# Patient Record
Sex: MALE | Race: WHITE | NOT HISPANIC OR LATINO | Employment: FULL TIME | URBAN - METROPOLITAN AREA
[De-identification: names, ages, dates, MRNs, and addresses within clinical notes are randomized per-mention and may not be internally consistent; named-entity substitution may affect disease eponyms.]

---
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
## 2021-01-18 ENCOUNTER — OFFICE VISIT (OUTPATIENT)
Dept: FAMILY MEDICINE CLINIC | Facility: CLINIC | Age: 21
End: 2021-01-18
Payer: COMMERCIAL

## 2021-01-18 VITALS
HEART RATE: 85 BPM | HEIGHT: 68 IN | OXYGEN SATURATION: 99 % | DIASTOLIC BLOOD PRESSURE: 76 MMHG | WEIGHT: 184.8 LBS | RESPIRATION RATE: 18 BRPM | BODY MASS INDEX: 28.01 KG/M2 | SYSTOLIC BLOOD PRESSURE: 126 MMHG | TEMPERATURE: 97.8 F

## 2021-01-18 DIAGNOSIS — E66.3 OVERWEIGHT WITH BODY MASS INDEX (BMI) OF 28 TO 28.9 IN ADULT: ICD-10-CM

## 2021-01-18 DIAGNOSIS — Z00.00 ANNUAL PHYSICAL EXAM: Primary | ICD-10-CM

## 2021-01-18 PROCEDURE — 3008F BODY MASS INDEX DOCD: CPT | Performed by: NURSE PRACTITIONER

## 2021-01-18 PROCEDURE — 99395 PREV VISIT EST AGE 18-39: CPT | Performed by: NURSE PRACTITIONER

## 2021-01-18 PROCEDURE — 3725F SCREEN DEPRESSION PERFORMED: CPT | Performed by: NURSE PRACTITIONER

## 2021-01-18 PROCEDURE — 4004F PT TOBACCO SCREEN RCVD TLK: CPT | Performed by: NURSE PRACTITIONER

## 2021-01-18 NOTE — PROGRESS NOTES
HPI:  Mel Sumner is a 24 y o  male here for his yearly health maintenance exam    Patient Active Problem List   Diagnosis    ADHD    Overweight (BMI 25 0-29  9)    Overweight with body mass index (BMI) of 28 to 28 9 in adult     Past Medical History:   Diagnosis Date    ADHD (attention deficit hyperactivity disorder)        1  Advanced Directive: na     2  Durable Power of  for Healthcare: na     3  Social History:               Marital History: single              Work Status: construction- full time              Drug and alcohol History: na              Alcohol Use: na     4  General Health: "fair"              Regular Dental Visits:yes              Vision problems:no              Hearing Loss:no              Immunizations up to date: na                 Lifestyle:                           Healthy Diet:limited junk food                          Tobacco Dorthey Mash- "quitting"                          Regular exercise:gym- weight lift                            PHQ-9 Depression Screening    PHQ-9:   Frequency of the following problems over the past two weeks:      Little interest or pleasure in doing things: 0 - not at all  Feeling down, depressed, or hopeless: 0 - not at all  PHQ-2 Score: 0           No current outpatient medications on file  No current facility-administered medications for this visit        No Known Allergies  Immunization History   Administered Date(s) Administered    DTaP 2000, 2001, 2001, 05/06/2003, 02/11/2005    HPV9 09/11/2018, 06/28/2019, 10/28/2019    Hep B, Adolescent or Pediatric 2001, 2001, 2001    Hepatitis A 04/10/2012, 08/12/2013    HiB 2001, 2001, 2001, 05/15/2002    IPV 2001, 2001, 05/06/2003, 02/11/2005    MMR 05/15/2002, 02/11/2005    Meningococcal MCV4P 04/10/2012, 09/11/2018    Pneumococcal Conjugate PCV 7 2001, 2001, 02/18/2002, 05/15/2002    Tdap 04/10/2012, 08/09/2013    Varicella 02/18/2002, 02/11/2005, 08/09/2013       Patient Care Team:  Josh Daniels as PCP - General (Family Medicine)  Viviana Amor DO as PCP - [address] [place] [address] (RTE)    Review of Systems   Constitutional: Negative for activity change, diaphoresis, fatigue and fever  HENT: Negative for congestion, facial swelling, hearing loss, rhinorrhea, sinus pressure, sinus pain, sneezing, sore throat and voice change  Eyes: Negative for discharge and visual disturbance  Respiratory: Negative for cough, choking, chest tightness, shortness of breath, wheezing and stridor  Cardiovascular: Negative for chest pain, palpitations and leg swelling  Gastrointestinal: Negative for abdominal distention, abdominal pain, constipation, diarrhea, nausea and vomiting  Endocrine: Negative for polydipsia, polyphagia and polyuria  Genitourinary: Negative for difficulty urinating, dysuria, frequency and urgency  Musculoskeletal: Negative for arthralgias, back pain, gait problem, joint swelling, myalgias, neck pain and neck stiffness  Skin: Negative for color change, rash and wound  Neurological: Negative for dizziness, syncope, speech difficulty, weakness, light-headedness and headaches  Hematological: Negative for adenopathy  Does not bruise/bleed easily  Psychiatric/Behavioral: Negative for agitation, behavioral problems, confusion, hallucinations, sleep disturbance and suicidal ideas  The patient is not nervous/anxious  Physical Exam :  Physical Exam  Vitals signs and nursing note reviewed  Constitutional:       General: He is not in acute distress  Appearance: He is well-developed  He is not diaphoretic  HENT:      Head: Normocephalic and atraumatic        Right Ear: Tympanic membrane, ear canal and external ear normal       Left Ear: Tympanic membrane, ear canal and external ear normal       Nose: Nose normal       Right Sinus: No maxillary sinus tenderness or frontal sinus tenderness  Left Sinus: No maxillary sinus tenderness or frontal sinus tenderness  Mouth/Throat:      Pharynx: Uvula midline  No oropharyngeal exudate  Eyes:      General:         Right eye: No discharge  Left eye: No discharge  Conjunctiva/sclera: Conjunctivae normal       Pupils: Pupils are equal, round, and reactive to light  Neck:      Musculoskeletal: Normal range of motion and neck supple  Thyroid: No thyromegaly  Trachea: No tracheal deviation  Cardiovascular:      Rate and Rhythm: Normal rate and regular rhythm  Heart sounds: Normal heart sounds  No murmur  No friction rub  No gallop  Pulmonary:      Effort: Pulmonary effort is normal  No respiratory distress  Breath sounds: Normal breath sounds  No wheezing or rales  Abdominal:      General: Bowel sounds are normal  There is no distension  Palpations: Abdomen is soft  There is no mass  Tenderness: There is no abdominal tenderness  There is no guarding or rebound  Musculoskeletal: Normal range of motion  General: No tenderness or deformity  Lymphadenopathy:      Cervical: No cervical adenopathy  Skin:     General: Skin is warm and dry  Findings: No erythema or rash  Neurological:      Mental Status: He is alert and oriented to person, place, and time  Cranial Nerves: No cranial nerve deficit  Coordination: Coordination normal    Psychiatric:         Mood and Affect: Mood normal          Speech: Speech normal          Behavior: Behavior normal          Thought Content: Thought content normal          Judgment: Judgment normal            Reviewed Updated [facility] Prior Wellness Visits:   Last Health Maintenance visit information was reviewed, patient interviewed , no change since last HM visit no  Last HM visit information was reviewed, patient interviewed and updates made to the record today no    Assessment and Plan:  1  Annual physical exam     2   Overweight with body mass index (BMI) of 28 to 28 9 in adult       BMI Counseling: Body mass index is 28 1 kg/m²  The BMI is above normal  Nutrition recommendations include decreasing portion sizes, encouraging healthy choices of fruits and vegetables, decreasing fast food intake, consuming healthier snacks, limiting drinks that contain sugar, moderation in carbohydrate intake and increasing intake of lean protein  Exercise recommendations include exercising 3-5 times per week  Tobacco Cessation Counseling: Tobacco cessation counseling was provided   The patient is sincerely urged to quit consumption of tobacco  He is ready to quit tobacco      Health Maintenance Due   Topic Date Due    Pneumococcal Vaccine: Pediatrics (0 to 5 Years) and At-Risk Patients (6 to 59 Years) (1 of 1 - PPSV23) 02/05/2007    HIV Screening  02/05/2016    Annual Physical  02/05/2019    Depression Screening PHQ  06/28/2020    BMI: Followup Plan  06/28/2020    Influenza Vaccine (1) 09/01/2020

## 2021-02-16 ENCOUNTER — OFFICE VISIT (OUTPATIENT)
Dept: URGENT CARE | Facility: CLINIC | Age: 21
End: 2021-02-16
Payer: COMMERCIAL

## 2021-02-16 VITALS — TEMPERATURE: 97.4 F | RESPIRATION RATE: 18 BRPM | HEART RATE: 88 BPM | OXYGEN SATURATION: 100 %

## 2021-02-16 DIAGNOSIS — Z11.59 SPECIAL SCREENING EXAMINATION FOR UNSPECIFIED VIRAL DISEASE: Primary | ICD-10-CM

## 2021-02-16 PROCEDURE — U0003 INFECTIOUS AGENT DETECTION BY NUCLEIC ACID (DNA OR RNA); SEVERE ACUTE RESPIRATORY SYNDROME CORONAVIRUS 2 (SARS-COV-2) (CORONAVIRUS DISEASE [COVID-19]), AMPLIFIED PROBE TECHNIQUE, MAKING USE OF HIGH THROUGHPUT TECHNOLOGIES AS DESCRIBED BY CMS-2020-01-R: HCPCS | Performed by: NURSE PRACTITIONER

## 2021-02-16 PROCEDURE — 99213 OFFICE O/P EST LOW 20 MIN: CPT | Performed by: NURSE PRACTITIONER

## 2021-02-16 PROCEDURE — G0382 LEV 3 HOSP TYPE B ED VISIT: HCPCS | Performed by: NURSE PRACTITIONER

## 2021-02-16 PROCEDURE — U0005 INFEC AGEN DETEC AMPLI PROBE: HCPCS | Performed by: NURSE PRACTITIONER

## 2021-02-16 PROCEDURE — 99283 EMERGENCY DEPT VISIT LOW MDM: CPT | Performed by: NURSE PRACTITIONER

## 2021-02-16 NOTE — PROGRESS NOTES
[address]        NAME: Bridger Willis is a 22 y o  male  : 2000    MRN: [id number]  DATE: 2021  TIME: 9:52 AM    Assessment and Plan   Special screening examination for unspecified viral disease [Z11 59]  1  Special screening examination for unspecified viral disease  Novel Coronavirus (Covid-19),PCR [facility]         Patient Instructions     Patient Instructions   Patient instructed to self quarantine  Advised to avoid nsaids  If you develop prolonged high fever, worsening or productive cough, shortness of breath, difficulty breathing, chest pain, or any new or concerning symptoms please proceed ER  Instructed patient to call ER prior to arrival make them aware she is being test for covid  Patient verbalizes understanding  Start vitamin c 1g twice daily,vitamin d3 2000IU daily, and multivitamin  monitor pulse ox  Call PCP in 24 hours for f/u  [address]    Your healthcare provider and/or public health staff have evaluated you and have determined that you do not need to remain in the hospital at this time  At this time you can be isolated at home where you will be monitored by staff from your local or state health department  You should carefully follow the prevention and isolation steps below until a healthcare provider or local or state health department says that you can return to your normal activities  Stay home except to get medical care    People who are mildly ill with COVID-19 are able to isolate at home during their illness  You should restrict activities outside your home, except for getting medical care  Do not go to work, school, or public areas  Avoid using public transportation, ride-sharing, or taxis  Separate yourself from other people and animals in your home    People: As much as possible, you should stay in a specific room and away from other people in your home   Also, you should use a separate bathroom, if available  Animals: You should restrict contact with pets and other animals while you are sick with COVID-19, just like you would around other people  Although there have not been reports of pets or other animals becoming sick with COVID-19, it is still recommended that people sick with COVID-19 limit contact with animals until more information is known about the virus  When possible, have another member of your household care for your animals while you are sick  If you are sick with COVID-19, avoid contact with your pet, including petting, snuggling, being kissed or licked, and sharing food  If you must care for your pet or be around animals while you are sick, wash your hands before and after you interact with pets and wear a facemask  See COVID-19 and Animals for more information  Call ahead before visiting your doctor    If you have a medical appointment, call the healthcare provider and tell them that you have or may have COVID-19  This will help the healthcare providers office take steps to keep other people from getting infected or exposed  Wear a facemask    You should wear a facemask when you are around other people (e g , sharing a room or vehicle) or pets and before you enter a healthcare providers office  If you are not able to wear a facemask (for example, because it causes trouble breathing), then people who live with you should not stay in the same room with you, or they should wear a facemask if they enter your room  Cover your coughs and sneezes    Cover your mouth and nose with a tissue when you cough or sneeze  Throw used tissues in a lined trash can  Immediately wash your hands with soap and water for at least 20 seconds or, if soap and water are not available, clean your hands with an alcohol-based hand  that contains at least 60% alcohol      Clean your hands often    Wash your hands often with soap and water for at least 20 seconds, especially after blowing your nose, coughing, or sneezing; going to the bathroom; and before eating or preparing food  If soap and water are not readily available, use an alcohol-based hand  with at least 60% alcohol, covering all surfaces of your hands and rubbing them together until they feel dry  Soap and water are the best option if hands are visibly dirty  Avoid touching your eyes, nose, and mouth with unwashed hands  Avoid sharing personal household items    You should not share dishes, drinking glasses, cups, eating utensils, towels, or bedding with other people or pets in your home  After using these items, they should be washed thoroughly with soap and water  Clean all high-touch surfaces everyday    High touch surfaces include counters, tabletops, doorknobs, bathroom fixtures, toilets, phones, keyboards, tablets, and bedside tables  Also, clean any surfaces that may have blood, stool, or body fluids on them  Use a household cleaning spray or wipe, according to the label instructions  Labels contain instructions for safe and effective use of the cleaning product including precautions you should take when applying the product, such as wearing gloves and making sure you have good ventilation during use of the product  Monitor your symptoms    Seek prompt medical attention if your illness is worsening (e g , difficulty breathing)  Before seeking care, call your healthcare provider and tell them that you have, or are being evaluated for, COVID-19  Put on a facemask before you enter the facility  These steps will help the healthcare providers office to keep other people in the office or waiting room from getting infected or exposed  Ask your healthcare provider to call the local or state health department  Persons who are placed under active monitoring or facilitated self-monitoring should follow instructions provided by their local health department or occupational health professionals, as appropriate    If you have a medical emergency and need to call 911, notify the dispatch personnel that you have, or are being evaluated for COVID-19  If possible, put on a facemask before emergency medical services arrive  Discontinuing home isolation    Patients with confirmed COVID-19 should remain under home isolation precautions until the following conditions are met:   - They have had no fever for at least 24 hours (that is one full day of no fever without the use medicine that reduces fevers)  AND  - other symptoms have improved (for example, when their cough or shortness of breath have improved)  AND  - If had mild or moderate illness, at least 10 days have passed since their symptoms first appeared or if severe illness (needed oxygen) or immunosuppressed, at least 20 days have passed since symptoms first appeared  Patients with confirmed COVID-19 should also notify close contacts (including their workplace) and ask that they self-quarantine  Currently, close contact is defined as being within 6 feet for 15 minutes or more from the period 24 hours starting 48 hours before symptom onset to the time at which the patient went into isolation  Close contacts of patients diagnosed with COVID-19 should be instructed by the patient to self-quarantine for 14 days from the last time of their last contact with the patient  Source: [employer]           Follow up with PCP in 3-5 days  Proceed to  ER if symptoms worsen  Chief Complaint     Chief Complaint   Patient presents with    COVID-19     Patient c/o cough, bodyaches, and headache x 3 days  Patient exposed to Covid-19 x 5 day ago  History of Present Illness         Patient is a 81-year-old male who presents with  A 3 day history of congestion headache, body aches  States that he was exposed to a co-worker who tested positive for COVID-19  States he was exposed 5 days ago    States they worked outside together unmasked for an entire workday  Patient denies any loss of taste or smell  Denies any fever or chills  Denies shortness of breath, , chest pain,or hemoptysis  Has been taking over-the-counter Tylenol with mild relief  Review of Systems   Review of Systems   Constitutional: Negative for chills, diaphoresis, fatigue and fever  HENT: Positive for congestion and rhinorrhea  Negative for ear discharge, ear pain, facial swelling, mouth sores, postnasal drip, sinus pressure, sinus pain, sore throat and trouble swallowing  Eyes: Negative for photophobia and visual disturbance  Respiratory: Positive for cough  Negative for chest tightness, shortness of breath, wheezing and stridor  Cardiovascular: Negative for chest pain and palpitations  Gastrointestinal: Negative for abdominal pain, diarrhea, nausea and vomiting  Genitourinary: Negative  Musculoskeletal: Positive for myalgias  Negative for arthralgias, back pain, joint swelling, neck pain and neck stiffness  Skin: Negative for rash  Neurological: Positive for headaches  Negative for dizziness, syncope, facial asymmetry, weakness, light-headedness and numbness  Current Medications     No current outpatient medications on file  Current Allergies     Allergies as of 02/16/2021    (No Known Allergies)            The following portions of the patient's history were reviewed and updated as appropriate: allergies, current medications, past family history, past medical history, past social history, past surgical history and problem list      Past Medical History:   Diagnosis Date    ADHD (attention deficit hyperactivity disorder)        History reviewed  No pertinent surgical history  Family History   Problem Relation Age of Onset    No Known Problems Mother     No Known Problems Father          Medications have been verified          Objective   Pulse 88   Temp (!) 97 4 °F (36 3 °C) (Temporal)   Resp 18   SpO2 100%   No LMP for male patient  Physical Exam     Physical Exam  Constitutional:       General: He is not in acute distress  Appearance: He is well-developed  HENT:      Head: Normocephalic and atraumatic  Right Ear: Hearing, tympanic membrane, ear canal and external ear normal       Left Ear: Hearing, tympanic membrane, ear canal and external ear normal       Nose: Rhinorrhea present  No mucosal edema  Right Sinus: No maxillary sinus tenderness or frontal sinus tenderness  Left Sinus: No maxillary sinus tenderness or frontal sinus tenderness  Mouth/Throat:      Mouth: Mucous membranes are moist       Pharynx: Oropharynx is clear  Uvula midline  No oropharyngeal exudate or posterior oropharyngeal erythema  Tonsils: No tonsillar exudate or tonsillar abscesses  1+ on the right  1+ on the left  Cardiovascular:      Rate and Rhythm: Normal rate and regular rhythm  Heart sounds: Normal heart sounds, S1 normal and S2 normal    Pulmonary:      Effort: Pulmonary effort is normal       Breath sounds: Normal breath sounds and air entry  Lymphadenopathy:      Cervical: No cervical adenopathy  Skin:     General: Skin is warm and dry  Capillary Refill: Capillary refill takes less than 2 seconds  Neurological:      Mental Status: He is alert and oriented to person, place, and time

## 2021-02-16 NOTE — PATIENT INSTRUCTIONS
Patient instructed to self quarantine  Advised to avoid nsaids  If you develop prolonged high fever, worsening or productive cough, shortness of breath, difficulty breathing, chest pain, or any new or concerning symptoms please proceed ER  Instructed patient to call ER prior to arrival make them aware she is being test for covid  Patient verbalizes understanding  Start vitamin c 1g twice daily,vitamin d3 2000IU daily, and multivitamin  monitor pulse ox  Call PCP in 24 hours for f/u  [address]    Your healthcare provider and/or public health staff have evaluated you and have determined that you do not need to remain in the hospital at this time  At this time you can be isolated at home where you will be monitored by staff from your local or state health department  You should carefully follow the prevention and isolation steps below until a healthcare provider or local or state health department says that you can return to your normal activities  Stay home except to get medical care    People who are mildly ill with COVID-19 are able to isolate at home during their illness  You should restrict activities outside your home, except for getting medical care  Do not go to work, school, or public areas  Avoid using public transportation, ride-sharing, or taxis  Separate yourself from other people and animals in your home    People: As much as possible, you should stay in a specific room and away from other people in your home  Also, you should use a separate bathroom, if available  Animals: You should restrict contact with pets and other animals while you are sick with COVID-19, just like you would around other people  Although there have not been reports of pets or other animals becoming sick with COVID-19, it is still recommended that people sick with COVID-19 limit contact with animals until more information is known about the virus   When possible, have another member of your household care for your animals while you are sick  If you are sick with COVID-19, avoid contact with your pet, including petting, snuggling, being kissed or licked, and sharing food  If you must care for your pet or be around animals while you are sick, wash your hands before and after you interact with pets and wear a facemask  See COVID-19 and Animals for more information  Call ahead before visiting your doctor    If you have a medical appointment, call the healthcare provider and tell them that you have or may have COVID-19  This will help the healthcare providers office take steps to keep other people from getting infected or exposed  Wear a facemask    You should wear a facemask when you are around other people (e g , sharing a room or vehicle) or pets and before you enter a healthcare providers office  If you are not able to wear a facemask (for example, because it causes trouble breathing), then people who live with you should not stay in the same room with you, or they should wear a facemask if they enter your room  Cover your coughs and sneezes    Cover your mouth and nose with a tissue when you cough or sneeze  Throw used tissues in a lined trash can  Immediately wash your hands with soap and water for at least 20 seconds or, if soap and water are not available, clean your hands with an alcohol-based hand  that contains at least 60% alcohol  Clean your hands often    Wash your hands often with soap and water for at least 20 seconds, especially after blowing your nose, coughing, or sneezing; going to the bathroom; and before eating or preparing food  If soap and water are not readily available, use an alcohol-based hand  with at least 60% alcohol, covering all surfaces of your hands and rubbing them together until they feel dry  Soap and water are the best option if hands are visibly dirty  Avoid touching your eyes, nose, and mouth with unwashed hands      Avoid sharing personal household items    You should not share dishes, drinking glasses, cups, eating utensils, towels, or bedding with other people or pets in your home  After using these items, they should be washed thoroughly with soap and water  Clean all high-touch surfaces everyday    High touch surfaces include counters, tabletops, doorknobs, bathroom fixtures, toilets, phones, keyboards, tablets, and bedside tables  Also, clean any surfaces that may have blood, stool, or body fluids on them  Use a household cleaning spray or wipe, according to the label instructions  Labels contain instructions for safe and effective use of the cleaning product including precautions you should take when applying the product, such as wearing gloves and making sure you have good ventilation during use of the product  Monitor your symptoms    Seek prompt medical attention if your illness is worsening (e g , difficulty breathing)  Before seeking care, call your healthcare provider and tell them that you have, or are being evaluated for, COVID-19  Put on a facemask before you enter the facility  These steps will help the healthcare providers office to keep other people in the office or waiting room from getting infected or exposed  Ask your healthcare provider to call the local or [facility] health department  Persons who are placed under active monitoring or facilitated self-monitoring should follow instructions provided by their local health department or occupational health professionals, as appropriate  If you have a medical emergency and need to call 911, notify the dispatch personnel that you have, or are being evaluated for COVID-19  If possible, put on a facemask before emergency medical services arrive      Discontinuing home isolation    Patients with confirmed COVID-19 should remain under home isolation precautions until the following conditions are met:   - They have had no fever for at least 24 hours (that is one full day of no fever without the use medicine that reduces fevers)  AND  - other symptoms have improved (for example, when their cough or shortness of breath have improved)  AND  - If had mild or moderate illness, at least 10 days have passed since their symptoms first appeared or if severe illness (needed oxygen) or immunosuppressed, at least 20 days have passed since symptoms first appeared  Patients with confirmed COVID-19 should also notify close contacts (including their workplace) and ask that they self-quarantine  Currently, close contact is defined as being within 6 feet for 15 minutes or more from the period 24 hours starting 48 hours before symptom onset to the time at which the patient went into isolation  Close contacts of patients diagnosed with COVID-19 should be instructed by the patient to self-quarantine for 14 days from the last time of their last contact with the patient       Source: [employer]

## 2021-02-17 ENCOUNTER — DOCUMENTATION (OUTPATIENT)
Dept: URGENT CARE | Facility: CLINIC | Age: 21
End: 2021-02-17

## 2021-02-17 LAB — SARS-COV-2 RNA RESP QL NAA+PROBE: NEGATIVE

## 2021-04-21 ENCOUNTER — TELEPHONE (OUTPATIENT)
Dept: FAMILY MEDICINE CLINIC | Facility: CLINIC | Age: 21
End: 2021-04-21

## 2021-04-21 NOTE — TELEPHONE ENCOUNTER
He was getting SSI  They owe him back pay  He needs a letter from his dr that he does not need a representative payee

## 2021-04-22 NOTE — TELEPHONE ENCOUNTER
I have no seen him for the reason he is on SSI- what is his diagnosis?  You can schedule him with an available provider to discuss

## 2021-05-04 ENCOUNTER — TELEPHONE (OUTPATIENT)
Dept: FAMILY MEDICINE CLINIC | Facility: CLINIC | Age: 21
End: 2021-05-04

## 2021-05-06 ENCOUNTER — OFFICE VISIT (OUTPATIENT)
Dept: FAMILY MEDICINE CLINIC | Facility: CLINIC | Age: 21
End: 2021-05-06
Payer: COMMERCIAL

## 2021-05-06 VITALS
HEART RATE: 81 BPM | BODY MASS INDEX: 28.49 KG/M2 | HEIGHT: 68 IN | TEMPERATURE: 98.4 F | SYSTOLIC BLOOD PRESSURE: 128 MMHG | OXYGEN SATURATION: 97 % | RESPIRATION RATE: 20 BRPM | WEIGHT: 188 LBS | DIASTOLIC BLOOD PRESSURE: 76 MMHG

## 2021-05-06 DIAGNOSIS — Z86.59 HISTORY OF ADHD: Primary | ICD-10-CM

## 2021-05-06 PROCEDURE — 1036F TOBACCO NON-USER: CPT | Performed by: NURSE PRACTITIONER

## 2021-05-06 PROCEDURE — 3008F BODY MASS INDEX DOCD: CPT | Performed by: NURSE PRACTITIONER

## 2021-05-06 PROCEDURE — 3725F SCREEN DEPRESSION PERFORMED: CPT | Performed by: NURSE PRACTITIONER

## 2021-05-06 PROCEDURE — 99212 OFFICE O/P EST SF 10 MIN: CPT | Performed by: NURSE PRACTITIONER

## 2021-05-06 NOTE — PROGRESS NOTES
[address] [place] [address] Primary Care        NAME: Eve Jaramillo is a 22 y o  male  : 2000    MRN: [id number]  DATE: May 6, 2021  TIME: 10:00 AM    Assessment and Plan   History of ADHD [Z86 59]  1  History of ADHD       1  History of ADHD  Letter for patient stating that he can manage his own money  Faxed to number given by patient  Patient Instructions     There are no Patient Instructions on file for this visit  Chief Complaint     Chief Complaint   Patient presents with    ADHD     pt requesting letter for SSI due to his ADHD         History of Present Illness       Patient here for an acute visit with a request for a letter for SSI due to his diagnosis of ADHD in the past and now is able to get back pay that they owe him  Needs a letter stating that he can manage his own money  Has been grocery shopping  Gives his mom money for his cell phone bill months and gives his mother money for her bills if she needs  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, doing construction  Living with mom until he gets more money to get his own place     Reports that he was on medication about 5 years ago        Review of Systems   Review of Systems   Constitutional: Negative  Respiratory: Negative  Negative for shortness of breath and wheezing  Cardiovascular: Negative  Negative for chest pain and palpitations  Musculoskeletal: Negative for myalgias  Neurological: Negative  Psychiatric/Behavioral: Negative  All other systems reviewed and are negative  PHQ-9 Depression Screening    PHQ-9:   Frequency of the following problems over the past two weeks:      Little interest or pleasure in doing things: 0 - not at all  Feeling down, depressed, or hopeless: 0 - not at all  PHQ-2 Score: 0        Current Medications     No current outpatient medications on file      Current Allergies     Allergies as of 2021    (No Known Allergies)            The following portions of the patient's history were reviewed and updated as appropriate: allergies, current medications, past family history, past medical history, past social history, past surgical history and problem list      Past Medical History:   Diagnosis Date    ADHD (attention deficit hyperactivity disorder)        No past surgical history on file  Family History   Problem Relation Age of Onset    No Known Problems Mother     No Known Problems Father          Medications have been verified  Objective   /76   Pulse 81   Temp 98 4 °F (36 9 °C)   Resp 20   Ht 5' 8" (1 727 m)   Wt 85 3 kg (188 lb)   SpO2 97%   BMI 28 59 kg/m²        Physical Exam     Physical Exam  Vitals signs and nursing note reviewed  Constitutional:       General: He is not in acute distress  Appearance: He is well-developed  He is not ill-appearing or diaphoretic  Neck:      Trachea: No tracheal deviation  Pulmonary:      Effort: Pulmonary effort is normal  No tachypnea, accessory muscle usage or respiratory distress  Neurological:      Mental Status: He is alert and oriented to person, place, and time  Psychiatric:         Speech: Speech normal          Behavior: Behavior normal  Behavior is cooperative  Thought Content:  Thought content normal

## 2021-06-21 ENCOUNTER — OFFICE VISIT (OUTPATIENT)
Dept: INTERNAL MEDICINE CLINIC | Facility: CLINIC | Age: 21
End: 2021-06-21

## 2021-06-21 VITALS
HEART RATE: 76 BPM | OXYGEN SATURATION: 98 % | TEMPERATURE: 98.9 F | WEIGHT: 182 LBS | HEIGHT: 71 IN | BODY MASS INDEX: 25.48 KG/M2 | DIASTOLIC BLOOD PRESSURE: 70 MMHG | SYSTOLIC BLOOD PRESSURE: 128 MMHG

## 2021-06-21 DIAGNOSIS — Z02.1 PHYSICAL EXAM, PRE-EMPLOYMENT: Primary | ICD-10-CM

## 2021-06-21 DIAGNOSIS — Z11.1 SCREENING FOR TUBERCULOSIS: ICD-10-CM

## 2021-06-21 PROCEDURE — 99499 UNLISTED E&M SERVICE: CPT | Performed by: INTERNAL MEDICINE

## 2021-06-21 PROCEDURE — 86580 TB INTRADERMAL TEST: CPT

## 2021-06-21 PROCEDURE — 3008F BODY MASS INDEX DOCD: CPT | Performed by: NURSE PRACTITIONER

## 2021-06-21 NOTE — PROGRESS NOTES
Assessment/Plan:    Pre-employment  Physical  exam     Diagnoses and all orders for this visit:    Physical exam, pre-employment    Screening for tuberculosis  -     TB Skin Test          Subjective:      Patient ID: Victorino Moon is a 22 y o  male  Paitent  Is here  For  A  Pre-employment  And  PPD      The following portions of the patient's history were reviewed and updated as appropriate: allergies, current medications, past family history, past medical history, past social history, past surgical history, and problem list     Review of Systems   Constitutional: Negative  HENT: Negative for dental problem, drooling, ear discharge and ear pain  Eyes: Negative for discharge, redness and itching  Respiratory: Negative for apnea, cough and wheezing  Cardiovascular: Negative for chest pain and palpitations  Gastrointestinal: Negative for abdominal pain, blood in stool, diarrhea and vomiting  Endocrine: Negative for polydipsia, polyphagia and polyuria  Genitourinary: Negative for decreased urine volume, dysuria and frequency  Musculoskeletal: Negative for arthralgias, myalgias and neck stiffness  Skin: Negative for pallor and wound  Allergic/Immunologic: Negative for environmental allergies and food allergies  Neurological: Negative for facial asymmetry, light-headedness, numbness and headaches  Hematological: Negative for adenopathy  Does not bruise/bleed easily  Psychiatric/Behavioral: Negative for agitation, behavioral problems and confusion  Objective:      /70 (BP Location: Left arm, Patient Position: Sitting, Cuff Size: Standard)   Pulse 76   Temp 98 9 °F (37 2 °C) (Temporal)   Ht 5' 10 5" (1 791 m)   Wt 82 6 kg (182 lb)   SpO2 98%   BMI 25 75 kg/m²          Physical Exam  Constitutional:       Appearance: Normal appearance  HENT:      Head: Normocephalic        Nose: Nose normal       Mouth/Throat:      Mouth: Mucous membranes are moist    Eyes:      Pupils: Pupils are equal, round, and reactive to light  Cardiovascular:      Rate and Rhythm: Regular rhythm  Heart sounds: Normal heart sounds  Pulmonary:      Breath sounds: Normal breath sounds  Abdominal:      Palpations: Abdomen is soft  Musculoskeletal:         General: No swelling  Cervical back: Neck supple  Skin:     General: Skin is warm  Neurological:      General: No focal deficit present  Mental Status: He is alert and oriented to person, place, and time  Psychiatric:         Mood and Affect: Mood normal        Normal  Physical  Exam   PPD  Will be  Ready in  48 hrs       GILBERTO Del Rio MD

## 2021-06-24 ENCOUNTER — CLINICAL SUPPORT (OUTPATIENT)
Dept: INTERNAL MEDICINE CLINIC | Facility: CLINIC | Age: 21
End: 2021-06-24

## 2021-06-24 DIAGNOSIS — Z11.1 SCREENING FOR TUBERCULOSIS: Primary | ICD-10-CM

## 2021-06-24 LAB
INDURATION: 0 MM
TB SKIN TEST: NEGATIVE

## 2021-07-22 ENCOUNTER — OFFICE VISIT (OUTPATIENT)
Dept: URGENT CARE | Facility: CLINIC | Age: 21
End: 2021-07-22
Payer: COMMERCIAL

## 2021-07-22 VITALS
BODY MASS INDEX: 25.89 KG/M2 | WEIGHT: 183 LBS | TEMPERATURE: 97.8 F | RESPIRATION RATE: 16 BRPM | DIASTOLIC BLOOD PRESSURE: 63 MMHG | HEART RATE: 68 BPM | OXYGEN SATURATION: 100 % | SYSTOLIC BLOOD PRESSURE: 135 MMHG

## 2021-07-22 DIAGNOSIS — H60.02 ABSCESS OF EXTERNAL EAR, LEFT: Primary | ICD-10-CM

## 2021-07-22 PROCEDURE — 99213 OFFICE O/P EST LOW 20 MIN: CPT | Performed by: NURSE PRACTITIONER

## 2021-07-22 RX ORDER — CEPHALEXIN 500 MG/1
500 CAPSULE ORAL EVERY 8 HOURS SCHEDULED
Qty: 21 CAPSULE | Refills: 0 | Status: SHIPPED | OUTPATIENT
Start: 2021-07-22 | End: 2021-07-29

## 2021-07-22 NOTE — PATIENT INSTRUCTIONS
Warm compresses 3 times a day  Start antibiotic  Take probiotic  If no improvement follow-up with PCP  Go to ER with any worsening symptoms

## 2021-07-22 NOTE — PROGRESS NOTES
[facility]        NAME: Holland Tan is a 22 y o  male  : 2000    MRN: [id number]  DATE: 2021  TIME: 6:45 PM    Assessment and Plan   Abscess of external ear, left [H60 02]  1  Abscess of external ear, left  cephalexin (KEFLEX) 500 mg capsule         Patient Instructions     Patient Instructions   Warm compresses 3 times a day  Start antibiotic  Take probiotic  If no improvement follow-up with PCP  Go to ER with any worsening symptoms  Chief Complaint     Chief Complaint   Patient presents with    Illness     lump in left ear x 1 week, painful to touch  Attempted several times to pop it         History of Present Illness   Holland Tan presents to the clinic c/o    This is a 67-year-old male here today with complaints of lump in the antihelix region  He states he has had this for about a week  He states he has attempted to pop this  There is no drainage from site  Pain with palpation of the area  No fevers bodies or chills  Review of Systems   Review of Systems   Constitutional: Negative for activity change, chills, fatigue and fever  HENT: Negative for congestion and rhinorrhea  Skin: Positive for wound  Neurological: Negative  Psychiatric/Behavioral: Negative  Current Medications     No long-term medications on file  Current Allergies     Allergies as of 2021    (No Known Allergies)            The following portions of the patient's history were reviewed and updated as appropriate: allergies, current medications, past family history, past medical history, past social history, past surgical history and problem list     Objective   /63   Pulse 68   Temp 97 8 °F (36 6 °C)   Resp 16   Wt 83 kg (183 lb)   SpO2 100%   BMI 25 89 kg/m²        Physical Exam     Physical Exam  Vitals and nursing note reviewed  Constitutional:       Appearance: Normal appearance     HENT:      Ears:     Cardiovascular:      Rate and Rhythm: Normal rate and regular rhythm  Pulses: Normal pulses  Heart sounds: Normal heart sounds  Neurological:      Mental Status: He is alert and oriented to person, place, and time  Psychiatric:         Mood and Affect: Mood normal          Behavior: Behavior normal          Thought Content:  Thought content normal          Judgment: Judgment normal

## 2021-09-28 ENCOUNTER — OFFICE VISIT (OUTPATIENT)
Dept: URGENT CARE | Facility: CLINIC | Age: 21
End: 2021-09-28
Payer: COMMERCIAL

## 2021-09-28 VITALS
HEIGHT: 70 IN | RESPIRATION RATE: 18 BRPM | OXYGEN SATURATION: 99 % | BODY MASS INDEX: 25.77 KG/M2 | WEIGHT: 180 LBS | SYSTOLIC BLOOD PRESSURE: 132 MMHG | TEMPERATURE: 98.2 F | DIASTOLIC BLOOD PRESSURE: 79 MMHG | HEART RATE: 76 BPM

## 2021-09-28 DIAGNOSIS — L25.5 CONTACT DERMATITIS DUE TO PLANT: Primary | ICD-10-CM

## 2021-09-28 PROCEDURE — 99213 OFFICE O/P EST LOW 20 MIN: CPT | Performed by: NURSE PRACTITIONER

## 2021-09-28 RX ORDER — PREDNISONE 10 MG/1
TABLET ORAL
Qty: 26 TABLET | Refills: 0 | Status: SHIPPED | OUTPATIENT
Start: 2021-09-28 | End: 2022-04-14

## 2021-09-28 NOTE — PATIENT INSTRUCTIONS
Take medication as directed  Can take Benadryl as needed for itching, it can make you drowsy  Oat meal soaks can be helpful  If you develop any increased redness, rash is spreading, facial swelling, shortness of breath, difficulty breathing, fever, any new or concerning symptoms please return or proceed ER  Advised follow-up with PCP in 3-5 days      Poison Ivy   WHAT YOU NEED TO KNOW:   Poison ivy is a plant that can cause an itchy, uncomfortable rash on your skin  Poison ivy grows as a shrub or vine in woods, fields, and areas of thick [place]  It has 3 bright green leaves on each stem that turn red in [place]  DISCHARGE INSTRUCTIONS:   Medicines:   · Antiseptic or drying creams or ointments: These medicines may be used to dry out the rash and decrease the itching  These products may be available without a doctor's order  · Steroids: This medicine helps decrease itching and inflammation  It can be given as a cream to apply to your skin or as a pill  · Antihistamines: This medicine may help decrease itching and help you sleep  It is available without a doctor's order  · Take your medicine as directed  Contact your healthcare provider if you think your medicine is not helping or if you have side effects  Tell him of her if you are allergic to any medicine  Keep a list of the medicines, vitamins, and herbs you take  Include the amounts, and when and why you take them  Bring the list or the pill bottles to follow-up visits  Carry your medicine list with you in case of an emergency  Follow up with your healthcare provider as directed:  Write down your questions so you remember to ask them during your visits  How your poison ivy rash spreads: You cannot spread poison ivy by touching your rash or the liquid from your blisters  Poison ivy is spread only if you scratch your skin while it still has oil on it  You may think your rash is spreading because new rashes appear over a number of days  This happens because areas covered by thin skin break out in a rash first  Your face or forearms may develop a rash before thicker areas, such as the palms of your hands  Self-care:   · Keep your rash clean and dry:  Wash it with soap and water  Gently pat it dry with a clean towel  · Try not to scratch or rub your rash: This can cause your skin to become infected  · Use a compress on your rash:  Dip a clean washcloth in cool water  Wring it out and place it on your rash  Leave the washcloth on your skin for 15 minutes  Do this at least 3 times per day  · Take a cornstarch or oatmeal bath: If your rash is too large to cover with wet washcloths, take 3 or 4 cornstarch baths daily  Mix 1 pound of cornstarch with a little water to make a paste  Add the paste to a tub full of water and mix well  You may also use colloidal oatmeal in the bath water  Use lukewarm water  Avoid hot water because it may cause your itching to increase  Prevent a poison ivy rash in the future:   · Wear skin protection:  Wear long pants, a long-sleeved shirt, and gloves  Use a skin block lotion to protect your skin from poison ivy oil  You can find this at a drugstore without a prescription  · Wash clothing after possible exposure: If you think you have been near a poison ivy plant, wash the clothes you were wearing separately from other clothes  Rinse the washing machine well after you take the clothes out  Scrub boots and shoes with warm, soapy water  Dry clean items and clothing that you cannot wash in water  Poison ivy oil is sticky and can stay on surfaces for a long time  It can cause a new rash even years later  · Bathe your pet:  Use warm water and shampoo on your pet's fur  This will prevent the spread of oil to your skin, car, and home  Wear long sleeves, long pants, and gloves while washing pets or any items that may have oil on them      · Reduce exposure to poison ivy:  Do not touch plants that look like poison ivy  Keep your yard free of poison ivy  While protecting your skin, remove the plant and the roots  Place them in a plastic bag and seal the bag tightly  · Do not burn poison ivy plants: This can spread the oil through the air  If you breathe the oil into your lungs, you could have swelling and serious breathing problems  Oil that clings to the fire eileen can land on your skin and cause a rash  Contact your healthcare provider if:   · You have pus, soft yellow scabs, or tenderness on the rash  · The itching gets worse or keeps you awake at night  · The rash covers more than 1/4 of your skin or spreads to your eyes, mouth, or genital area  · The rash is not better after 2 to 3 weeks  · You have tender, swollen glands on the sides of your neck  · You have swelling in your arms and legs  · You have questions or concerns about your condition or care  Return to the emergency department if:   · You have a fever  · You have redness, swelling, and tenderness around the rash  · You have trouble breathing  © Copyright [employer] 2021 Information is for End User's use only and may not be sold, redistributed or otherwise used for commercial purposes  All illustrations and images included in CareNotes® are the copyrighted property of [employer]  or [facility] Larry Healy   The above information is an  only  It is not intended as medical advice for individual conditions or treatments  Talk to your doctor, nurse or pharmacist before following any medical regimen to see if it is safe and effective for you

## 2021-09-30 NOTE — PROGRESS NOTES
[facility]        NAME: Cassie Sampson is a 22 y o  male  : 2000    MRN: [id number]  DATE: 2021  TIME: 10:41 AM    Assessment and Plan   Contact dermatitis due to plant [L25 5]  1  Contact dermatitis due to plant  predniSONE 10 mg tablet    hydrocortisone 2 5 % cream         Patient Instructions     Patient Instructions   Take medication as directed  Can take Benadryl as needed for itching, it can make you drowsy  Oat meal soaks can be helpful  If you develop any increased redness, rash is spreading, facial swelling, shortness of breath, difficulty breathing, fever, any new or concerning symptoms please return or proceed ER  Advised follow-up with PCP in 3-5 days      Poison Ivy   WHAT YOU NEED TO KNOW:   Poison ivy is a plant that can cause an itchy, uncomfortable rash on your skin  Poison ivy grows as a shrub or vine in woods, fields, and areas of thick [place]  It has 3 bright green leaves on each stem that turn red in [place]  DISCHARGE INSTRUCTIONS:   Medicines:   · Antiseptic or drying creams or ointments: These medicines may be used to dry out the rash and decrease the itching  These products may be available without a doctor's order  · Steroids: This medicine helps decrease itching and inflammation  It can be given as a cream to apply to your skin or as a pill  · Antihistamines: This medicine may help decrease itching and help you sleep  It is available without a doctor's order  · Take your medicine as directed  Contact your healthcare provider if you think your medicine is not helping or if you have side effects  Tell him of her if you are allergic to any medicine  Keep a list of the medicines, vitamins, and herbs you take  Include the amounts, and when and why you take them  Bring the list or the pill bottles to follow-up visits  Carry your medicine list with you in case of an emergency      Follow up with your healthcare provider as directed:  Write down your questions so you remember to ask them during your visits  How your poison ivy rash spreads: You cannot spread poison ivy by touching your rash or the liquid from your blisters  Poison ivy is spread only if you scratch your skin while it still has oil on it  You may think your rash is spreading because new rashes appear over a number of days  This happens because areas covered by thin skin break out in a rash first  Your face or forearms may develop a rash before thicker areas, such as the palms of your hands  Self-care:   · Keep your rash clean and dry:  Wash it with soap and water  Gently pat it dry with a clean towel  · Try not to scratch or rub your rash: This can cause your skin to become infected  · Use a compress on your rash:  Dip a clean washcloth in cool water  Wring it out and place it on your rash  Leave the washcloth on your skin for 15 minutes  Do this at least 3 times per day  · Take a cornstarch or oatmeal bath: If your rash is too large to cover with wet washcloths, take 3 or 4 cornstarch baths daily  Mix 1 pound of cornstarch with a little water to make a paste  Add the paste to a tub full of water and mix well  You may also use colloidal oatmeal in the bath water  Use lukewarm water  Avoid hot water because it may cause your itching to increase  Prevent a poison ivy rash in the future:   · Wear skin protection:  Wear long pants, a long-sleeved shirt, and gloves  Use a skin block lotion to protect your skin from poison ivy oil  You can find this at a drugstore without a prescription  · Wash clothing after possible exposure: If you think you have been near a poison ivy plant, wash the clothes you were wearing separately from other clothes  Rinse the washing machine well after you take the clothes out  Scrub boots and shoes with warm, soapy water  Dry clean items and clothing that you cannot wash in water   Poison ivy oil is sticky and can stay on surfaces for a long time  It can cause a new rash even years later  · Bathe your pet:  Use warm water and shampoo on your pet's fur  This will prevent the spread of oil to your skin, car, and home  Wear long sleeves, long pants, and gloves while washing pets or any items that may have oil on them  · Reduce exposure to poison ivy:  Do not touch plants that look like poison ivy  Keep your yard free of poison ivy  While protecting your skin, remove the plant and the roots  Place them in a plastic bag and seal the bag tightly  · Do not burn poison ivy plants: This can spread the oil through the air  If you breathe the oil into your lungs, you could have swelling and serious breathing problems  Oil that clings to the fire eileen can land on your skin and cause a rash  Contact your healthcare provider if:   · You have pus, soft yellow scabs, or tenderness on the rash  · The itching gets worse or keeps you awake at night  · The rash covers more than 1/4 of your skin or spreads to your eyes, mouth, or genital area  · The rash is not better after 2 to 3 weeks  · You have tender, swollen glands on the sides of your neck  · You have swelling in your arms and legs  · You have questions or concerns about your condition or care  Return to the emergency department if:   · You have a fever  · You have redness, swelling, and tenderness around the rash  · You have trouble breathing  © Copyright [employer] 2021 Information is for End User's use only and may not be sold, redistributed or otherwise used for commercial purposes  All illustrations and images included in CareNotes® are the copyrighted property of [employer]  or John Healy   The above information is an  only  It is not intended as medical advice for individual conditions or treatments  Talk to your doctor, nurse or pharmacist before following any medical regimen to see if it is safe and effective for you            Follow up with PCP in 3-5 days  Proceed to  ER if symptoms worsen  Chief Complaint     Chief Complaint   Patient presents with    Rash     Patient presents with rash to BUE, abdomen, and back that started 3 days ago  History of Present Illness        Patient is a 61-year-old male who presents with a 3 day history of a rash to bilateral arms, abdomen, and back  Patient states he was outside around poison ivy  Has been using over-the-counter calamine lotion with no relief  Denies any facial swelling, tongue swelling, or difficulty breathing  Denies any close contacts with similar rash  Denies any new exposures soaps, detergents, or lotions      Review of Systems   Review of Systems   Constitutional: Negative for chills, diaphoresis, fatigue and fever  HENT: Negative  Eyes: Negative for photophobia and visual disturbance  Respiratory: Negative for cough, chest tightness, shortness of breath, wheezing and stridor  Cardiovascular: Negative for chest pain and palpitations  Gastrointestinal: Negative  Musculoskeletal: Negative for arthralgias, back pain, joint swelling, myalgias, neck pain and neck stiffness  Skin: Positive for rash  Neurological: Negative for dizziness, syncope, weakness, light-headedness, numbness and headaches  Current Medications       Current Outpatient Medications:     hydrocortisone 2 5 % cream, Apply topically 2 (two) times a day for 7 days, Disp: 30 g, Rfl: 0    predniSONE 10 mg tablet, 3 tablets BID x 2 days,2 tablets BID x 2 days,1 tablet BID x 2 days,1 tablet daily x 2 days  , Disp: 26 tablet, Rfl: 0    Current Allergies     Allergies as of 09/28/2021    (No Known Allergies)            The following portions of the patient's history were reviewed and updated as appropriate: allergies, current medications, past family history, past medical history, past social history, past surgical history and problem list      Past Medical History:   Diagnosis Date    ADHD (attention deficit hyperactivity disorder)        History reviewed  No pertinent surgical history  Family History   Problem Relation Age of Onset    No Known Problems Mother     No Known Problems Father          Medications have been verified  Objective   /79   Pulse 76   Temp 98 2 °F (36 8 °C) (Temporal)   Resp 18   Ht 5' 10" (1 778 m)   Wt 81 6 kg (180 lb)   SpO2 99%   BMI 25 83 kg/m²   No LMP for male patient  Physical Exam     Physical Exam  Constitutional:       General: He is not in acute distress  Appearance: Normal appearance  He is not diaphoretic  HENT:      Mouth/Throat:      Pharynx: Oropharynx is clear  Uvula midline  Cardiovascular:      Rate and Rhythm: Normal rate and regular rhythm  Heart sounds: Normal heart sounds, S1 normal and S2 normal    Pulmonary:      Effort: Pulmonary effort is normal       Breath sounds: Normal breath sounds and air entry  Lymphadenopathy:      Cervical: No cervical adenopathy  Skin:     General: Skin is warm and dry  Capillary Refill: Capillary refill takes less than 2 seconds  Findings: Rash (to bilateral arms, anterior and posterior trunk  ) present  Rash is macular and papular  Neurological:      Mental Status: He is alert

## 2022-04-14 ENCOUNTER — OFFICE VISIT (OUTPATIENT)
Dept: FAMILY MEDICINE CLINIC | Facility: CLINIC | Age: 22
End: 2022-04-14
Payer: COMMERCIAL

## 2022-04-14 VITALS
DIASTOLIC BLOOD PRESSURE: 72 MMHG | HEART RATE: 96 BPM | OXYGEN SATURATION: 98 % | TEMPERATURE: 97.8 F | WEIGHT: 192 LBS | SYSTOLIC BLOOD PRESSURE: 118 MMHG | HEIGHT: 70 IN | BODY MASS INDEX: 27.49 KG/M2

## 2022-04-14 DIAGNOSIS — F90.9 ATTENTION DEFICIT HYPERACTIVITY DISORDER (ADHD), UNSPECIFIED ADHD TYPE: Primary | ICD-10-CM

## 2022-04-14 PROCEDURE — 99214 OFFICE O/P EST MOD 30 MIN: CPT | Performed by: NURSE PRACTITIONER

## 2022-04-14 RX ORDER — ATOMOXETINE 40 MG/1
40 CAPSULE ORAL DAILY
Qty: 30 CAPSULE | Refills: 0 | Status: SHIPPED | OUTPATIENT
Start: 2022-04-14

## 2022-04-14 NOTE — PROGRESS NOTES
[address] [place] [address] Primary Care        NAME: Malka Acevedo is a 25 y o  male  : 2000    MRN: [id number]  DATE: 2022  TIME: 4:35 PM    Assessment and Plan   Attention deficit hyperactivity disorder (ADHD), unspecified ADHD type [F90 9]  1  Attention deficit hyperactivity disorder (ADHD), unspecified ADHD type  atoMOXetine (STRATTERA) 40 mg capsule   1  Attention deficit hyperactivity disorder (ADHD), unspecified ADHD type  - will try medication for ADHD- non stimulant to  See if this helps with his symptoms  Follow up in 4-6 weeks after starting medication    - atoMOXetine (STRATTERA) 40 mg capsule; Take 1 capsule (40 mg total) by mouth daily  Dispense: 30 capsule; Refill: 0        Patient Instructions     There are no Patient Instructions on file for this visit  Chief Complaint     Chief Complaint   Patient presents with    ADHD     Patient was previously on medication for ADHD  Has tried several medications  Would like to start back on medication  History of Present Illness       Patient here with c/o worsening ADHD symptoms  Has taken medication in the past but was stopped about 5 years ago  Was doing well last year when he was seen and managing a job and home responsibilities  most of the time he is foods at work, but takes it out the wrong way at work, gets angry or frustrated  Able to compete tasks at work without difficulty  Feels like ADHD was worse when he was younger  - could possibly lose his job per mothers note  Does construction for work  Feels like he is completing tasks  No trouble sleeping  Was on medication 2-3 years ago- Ritalin(anger), Adderall (needed higher doses),  Concera ( worked fairly well)  Thought he could manage without medication but mother has been noticing that he has not been managing well  Agreeable to start medication  Note from mother scanned into his chart  ADHD form compelted at visit         Review of Systems   Review of Systems   Psychiatric/Behavioral: Positive for agitation (at times)  Negative for confusion, decreased concentration, self-injury and sleep disturbance  The patient is hyperactive  The patient is not nervous/anxious  PHQ-2/9 Depression Screening    Little interest or pleasure in doing things: 0 - not at all  Feeling down, depressed, or hopeless: 0 - not at all  PHQ-2 Score: 0  PHQ-2 Interpretation: Negative depression screen        Current Medications       Current Outpatient Medications:     atoMOXetine (STRATTERA) 40 mg capsule, Take 1 capsule (40 mg total) by mouth daily, Disp: 30 capsule, Rfl: 0    Current Allergies     Allergies as of 04/14/2022    (No Known Allergies)            The following portions of the patient's history were reviewed and updated as appropriate: allergies, current medications, past family history, past medical history, past social history, past surgical history and problem list      Past Medical History:   Diagnosis Date    ADHD (attention deficit hyperactivity disorder)        No past surgical history on file  Family History   Problem Relation Age of Onset    No Known Problems Mother     No Known Problems Father          Medications have been verified  Objective   /72   Pulse 96   Temp 97 8 °F (36 6 °C)   Ht 5' 10" (1 778 m)   Wt 87 1 kg (192 lb)   SpO2 98%   BMI 27 55 kg/m²        Physical Exam     Physical Exam  Vitals and nursing note reviewed  Constitutional:       General: He is not in acute distress  Appearance: Normal appearance  He is well-developed and overweight  He is not ill-appearing or diaphoretic  HENT:      Nose: Nose normal    Neck:      Trachea: No tracheal deviation  Pulmonary:      Effort: No tachypnea, accessory muscle usage or respiratory distress  Breath sounds: No stridor  Neurological:      Mental Status: He is alert and oriented to person, place, and time     Psychiatric:         Speech: Speech normal  Behavior: Behavior normal  Behavior is cooperative  Thought Content:  Thought content normal

## 2022-04-15 ENCOUNTER — TELEPHONE (OUTPATIENT)
Dept: FAMILY MEDICINE CLINIC | Facility: CLINIC | Age: 22
End: 2022-04-15

## 2022-04-15 NOTE — TELEPHONE ENCOUNTER
Received notification from pharmacy that Strattera 40mg requires prior auth  Prior auth completed via cover my meds  Key: [id number]    Awaiting determination

## 2022-04-18 ENCOUNTER — TELEPHONE (OUTPATIENT)
Dept: FAMILY MEDICINE CLINIC | Facility: CLINIC | Age: 22
End: 2022-04-18

## 2022-04-18 NOTE — TELEPHONE ENCOUNTER
Pt needs a prior authorization for his Atomoxetine 40 mg takes 1 Daily  # 30    Please advise    Phone: [phone]

## 2022-04-26 ENCOUNTER — HOSPITAL ENCOUNTER (EMERGENCY)
Facility: HOSPITAL | Age: 22
Discharge: HOME | End: 2022-04-26
Attending: EMERGENCY MEDICINE
Payer: COMMERCIAL

## 2022-04-26 ENCOUNTER — APPOINTMENT (EMERGENCY)
Dept: RADIOLOGY | Facility: HOSPITAL | Age: 22
End: 2022-04-26

## 2022-04-26 VITALS
WEIGHT: 190 LBS | SYSTOLIC BLOOD PRESSURE: 134 MMHG | TEMPERATURE: 98.1 F | BODY MASS INDEX: 27.2 KG/M2 | DIASTOLIC BLOOD PRESSURE: 69 MMHG | OXYGEN SATURATION: 98 % | HEIGHT: 70 IN | RESPIRATION RATE: 18 BRPM | HEART RATE: 67 BPM

## 2022-04-26 DIAGNOSIS — M54.9 BACK PAIN: Primary | ICD-10-CM
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
## 2022-04-26 PROCEDURE — 96372 THER/PROPH/DIAG INJ SC/IM: CPT

## 2022-04-26 PROCEDURE — 99283 EMERGENCY DEPT VISIT LOW MDM: CPT

## 2022-04-26 PROCEDURE — 72100 X-RAY EXAM L-S SPINE 2/3 VWS: CPT

## 2022-04-26 PROCEDURE — 99284 EMERGENCY DEPT VISIT MOD MDM: CPT

## 2022-04-26 RX ORDER — LIDOCAINE 50 MG/G
1 PATCH TOPICAL ONCE
Status: DISCONTINUED | OUTPATIENT
Start: 2022-04-26 | End: 2022-04-27 | Stop reason: HOSPADM

## 2022-04-26 RX ORDER — METOCLOPRAMIDE 5 MG/1
10 TABLET ORAL ONCE
Status: COMPLETED | OUTPATIENT
Start: 2022-04-26 | End: 2022-04-26

## 2022-04-26 RX ORDER — KETOROLAC TROMETHAMINE 30 MG/ML
30 INJECTION, SOLUTION INTRAMUSCULAR; INTRAVENOUS ONCE
Status: COMPLETED | OUTPATIENT
Start: 2022-04-26 | End: 2022-04-26

## 2022-04-26 RX ORDER — DIPHENHYDRAMINE HCL 25 MG
25 TABLET ORAL ONCE
Status: COMPLETED | OUTPATIENT
Start: 2022-04-26 | End: 2022-04-26

## 2022-04-26 RX ORDER — TRAMADOL HYDROCHLORIDE 50 MG/1
50 TABLET ORAL EVERY 6 HOURS PRN
Qty: 12 TABLET | Refills: 0 | Status: SHIPPED | OUTPATIENT
Start: 2022-04-26 | End: 2022-04-29

## 2022-04-26 RX ADMIN — DIPHENHYDRAMINE HCL 25 MG: 25 TABLET ORAL at 21:59

## 2022-04-26 RX ADMIN — LIDOCAINE 1 PATCH: 50 PATCH TOPICAL at 21:59

## 2022-04-26 RX ADMIN — METOCLOPRAMIDE 10 MG: 5 TABLET ORAL at 21:59

## 2022-04-26 RX ADMIN — KETOROLAC TROMETHAMINE 30 MG: 30 INJECTION, SOLUTION INTRAMUSCULAR at 21:59
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
## 2022-04-27 NOTE — DISCHARGE INSTRUCTIONS
Please return to the ED if significant changes or worsening symptoms develop  Alternate between tylenol and ibuprofen for pain control and only take tramadol for severe pain

## 2022-05-29 ENCOUNTER — HOSPITAL ENCOUNTER (EMERGENCY)
Facility: HOSPITAL | Age: 22
Discharge: HOME | End: 2022-05-30
Attending: EMERGENCY MEDICINE | Admitting: EMERGENCY MEDICINE
Payer: COMMERCIAL

## 2022-05-29 DIAGNOSIS — F32.A DEPRESSION: Primary | ICD-10-CM

## 2022-05-29 LAB
AMPHETAMINES SERPL QL SCN: NEGATIVE
BARBITURATES UR QL: NEGATIVE
BENZODIAZ UR QL: NEGATIVE
COCAINE UR QL: NEGATIVE
ETHANOL EXG-MCNC: 0 MG/DL
METHADONE UR QL: NEGATIVE
OPIATES UR QL SCN: NEGATIVE
OXYCODONE+OXYMORPHONE UR QL SCN: NEGATIVE
PCP UR QL: NEGATIVE
THC UR QL: NEGATIVE

## 2022-05-29 PROCEDURE — 80307 DRUG TEST PRSMV CHEM ANLYZR: CPT | Performed by: EMERGENCY MEDICINE

## 2022-05-29 PROCEDURE — 82075 ASSAY OF BREATH ETHANOL: CPT | Performed by: EMERGENCY MEDICINE

## 2022-05-29 PROCEDURE — 99283 EMERGENCY DEPT VISIT LOW MDM: CPT

## 2022-05-29 PROCEDURE — 99284 EMERGENCY DEPT VISIT MOD MDM: CPT | Performed by: EMERGENCY MEDICINE

## 2022-05-29 PROCEDURE — 0241U HB NFCT DS VIR RESP RNA 4 TRGT: CPT | Performed by: EMERGENCY MEDICINE

## 2022-05-30 VITALS
OXYGEN SATURATION: 100 % | SYSTOLIC BLOOD PRESSURE: 124 MMHG | DIASTOLIC BLOOD PRESSURE: 63 MMHG | RESPIRATION RATE: 18 BRPM | TEMPERATURE: 98 F | HEART RATE: 68 BPM

## 2022-05-30 LAB
FLUAV RNA RESP QL NAA+PROBE: NEGATIVE
FLUBV RNA RESP QL NAA+PROBE: NEGATIVE
RSV RNA RESP QL NAA+PROBE: NEGATIVE
SARS-COV-2 RNA RESP QL NAA+PROBE: POSITIVE

## 2022-05-30 NOTE — DISCHARGE INSTRUCTIONS
RETURN IF WORSE IN ANY WAY:   WORSENING DEPRESSION   OR NEW AND CONCERNING SYMPTOMS SIGNS OR SYMPTOMS      PLEASE CALL YOUR PRIMARY DOCTOR IN THE MORNING TO SET UP FOLLOW UP   PLEASE REVIEW THE WORK UP RESULTS WITH YOUR DOCTOR

## 2022-05-30 NOTE — ED PROVIDER NOTES
History  Chief Complaint   Patient presents with    Psychiatric Evaluation     Friends called the  saying pt was threatening to harm himself  Pt states he is under a lot of stress       26 YO Male  HERE FOR Depression/SI    He denies Drugs/etoh    Pt is having relationship problems  States that he has had passive suicidal thoughts    Does not have a definite plan, but has knives at home and thinks about killing himself with them   Pt does have a cutting hx, but has not done so in about 2 years       PRIOR SUICIDE ATTEMPTS:  NONE   PRIOR PSYCHIATRIC HOSPITALIZATIONS:  NONE      TRAUMA: NONE  RECENT ILLNESS: NONE      PT HAS NO SOMATIC COMPLAINTS         History provided by:  Patient  Psychiatric Evaluation  Presenting symptoms: depression and suicidal thoughts    Degree of incapacity (severity): Moderate  Onset quality:  Gradual  Chronicity:  Recurrent  Context: not alcohol use, not drug abuse, not medication and not noncompliant    Relieved by:  Nothing  Worsened by:  Nothing  Ineffective treatments:  None tried  Associated symptoms: no abdominal pain, no chest pain, no fatigue and no headaches        Prior to Admission Medications   Prescriptions Last Dose Informant Patient Reported? Taking? atoMOXetine (STRATTERA) 40 mg capsule   No No   Sig: Take 1 capsule (40 mg total) by mouth daily   Patient not taking: Reported on 2022       Facility-Administered Medications: None       Past Medical History:   Diagnosis Date    ADHD (attention deficit hyperactivity disorder)        History reviewed  No pertinent surgical history  Family History   Problem Relation Age of Onset    No Known Problems Mother     No Known Problems Father      I have reviewed and agree with the history as documented      E-Cigarette/Vaping     E-Cigarette/Vaping Substances     Social History     Tobacco Use    Smoking status: Former Smoker     Types: E-Cigarettes     Quit date: 2021     Years since quittin 1    Smokeless tobacco: Never Used   Substance Use Topics    Alcohol use: Yes     Comment: socially    Drug use: No       Review of Systems   Constitutional: Negative for chills, diaphoresis, fatigue and fever  HENT: Negative for rhinorrhea, sinus pressure, sinus pain, sore throat, trouble swallowing and voice change  Respiratory: Negative for cough, shortness of breath, wheezing and stridor  Cardiovascular: Negative for chest pain, palpitations and leg swelling  Gastrointestinal: Negative for abdominal pain, blood in stool, nausea and vomiting  Genitourinary: Negative for difficulty urinating, dysuria, flank pain and frequency  Musculoskeletal: Negative for arthralgias, back pain, gait problem, joint swelling, myalgias, neck pain and neck stiffness  Skin: Negative for rash and wound  Neurological: Negative for dizziness, light-headedness and headaches  Psychiatric/Behavioral: Positive for suicidal ideas  Depression    All other systems reviewed and are negative  Physical Exam  Physical Exam  Constitutional:       General: He is not in acute distress  Appearance: He is well-developed  He is not ill-appearing, toxic-appearing or diaphoretic  HENT:      Head: Normocephalic and atraumatic  Right Ear: External ear normal       Left Ear: External ear normal       Nose: Nose normal       Mouth/Throat:      Pharynx: No oropharyngeal exudate or posterior oropharyngeal erythema  Eyes:      General: No scleral icterus  Right eye: No discharge  Left eye: No discharge  Extraocular Movements: Extraocular movements intact  Conjunctiva/sclera: Conjunctivae normal       Pupils: Pupils are equal, round, and reactive to light  Neck:      Vascular: No JVD  Trachea: No tracheal deviation  Cardiovascular:      Rate and Rhythm: Normal rate and regular rhythm  Pulses: Normal pulses  Heart sounds: Normal heart sounds  No murmur heard  No friction rub  No gallop  Pulmonary:      Effort: Pulmonary effort is normal  No respiratory distress  Breath sounds: Normal breath sounds  No stridor  No wheezing, rhonchi or rales  Chest:      Chest wall: No tenderness  Abdominal:      General: Bowel sounds are normal  There is no distension  Palpations: Abdomen is soft  There is no mass  Tenderness: There is no abdominal tenderness  There is no right CVA tenderness, left CVA tenderness, guarding or rebound  Hernia: No hernia is present  Musculoskeletal:         General: No swelling, tenderness, deformity or signs of injury  Normal range of motion  Cervical back: Normal range of motion and neck supple  No rigidity or tenderness  Right lower leg: No edema  Left lower leg: No edema  Lymphadenopathy:      Cervical: No cervical adenopathy  Skin:     General: Skin is warm  Capillary Refill: Capillary refill takes less than 2 seconds  Coloration: Skin is not jaundiced or pale  Findings: No bruising, erythema, lesion or rash  Neurological:      General: No focal deficit present  Mental Status: He is alert and oriented to person, place, and time  Mental status is at baseline  Cranial Nerves: No cranial nerve deficit  Sensory: No sensory deficit  Motor: No weakness or abnormal muscle tone  Coordination: Coordination normal    Psychiatric:         Mood and Affect: Mood normal          Behavior: Behavior normal          Thought Content:  Thought content normal       Comments: Mildly Depressed affect          Vital Signs  ED Triage Vitals [05/30/22 0011]   Temperature Pulse Respirations Blood Pressure SpO2   98 °F (36 7 °C) 68 18 124/63 100 %      Temp Source Heart Rate Source Patient Position - Orthostatic VS BP Location FiO2 (%)   Temporal Monitor Sitting Right arm --      Pain Score       --           Vitals:    05/30/22 0011   BP: 124/63   Pulse: 68   Patient Position - Orthostatic VS: Sitting         Visual Acuity      ED Medications  Medications - No data to display    Diagnostic Studies  Results Reviewed     Procedure Component Value Units Date/Time    COVID/FLU/RSV - 2 hour TAT [[id number]]  (Abnormal) Collected: 05/29/22 2332    Lab Status: Final result Specimen: Nares from Nose Updated: 05/30/22 0016     SARS-CoV-2 Positive     INFLUENZA A PCR Negative     INFLUENZA B PCR Negative     RSV PCR Negative    Narrative:      FOR PEDIATRIC PATIENTS - copy/paste COVID Guidelines URL to browser: [email or web address]  [employer]    SARS-CoV-2 assay is a Nucleic Acid Amplification assay intended for the  qualitative detection of nucleic acid from SARS-CoV-2 in nasopharyngeal  swabs  Results are for the presumptive identification of SARS-CoV-2 RNA  Positive results are indicative of infection with SARS-CoV-2, the virus  causing COVID-19, but do not rule out bacterial infection or co-infection  with other viruses  Laboratories within the [place] and its  territories are required to report all positive results to the appropriate  public health authorities  Negative results do not preclude SARS-CoV-2  infection and should not be used as the sole basis for treatment or other  patient management decisions  Negative results must be combined with  clinical observations, patient history, and epidemiological information  This test has not been FDA cleared or approved  This test has been authorized by FDA under an Emergency Use Authorization  (EUA)  This test is only authorized for the duration of time the  declaration that circumstances exist justifying the authorization of the  emergency use of an in vitro diagnostic tests for detection of SARS-CoV-2  virus and/or diagnosis of COVID-19 infection under section 564(b)(1) of  the Act, 21 U  S C  458ONR-6(F)(9), unless the authorization is terminated  or revoked sooner   The test has been validated but independent review by FDA  and CLIA is pending  Test performed using [employer] GeneXpert: This RT-PCR assay targets N2,  a region unique to SARS-CoV-2  A conserved region in the E-gene was chosen  for pan-Sarbecovirus detection which includes SARS-CoV-2  Rapid drug screen, urine [[id number]]  (Normal) Collected: 05/29/22 2332    Lab Status: Final result Specimen: Urine, Clean Catch Updated: 05/29/22 2348     Amph/Meth UR Negative     Barbiturate Ur Negative     Benzodiazepine Urine Negative     Cocaine Urine Negative     Methadone Urine Negative     Opiate Urine Negative     PCP Ur Negative     THC Urine Negative     Oxycodone Urine Negative    Narrative:      FOR MEDICAL PURPOSES ONLY  IF CONFIRMATION NEEDED PLEASE CONTACT THE LAB WITHIN 5 DAYS      Drug Screen Cutoff Levels:  AMPHETAMINE/METHAMPHETAMINES  1000 ng/mL  BARBITURATES     200 ng/mL  BENZODIAZEPINES     200 ng/mL  COCAINE      300 ng/mL  METHADONE      300 ng/mL  OPIATES      300 ng/mL  PHENCYCLIDINE     25 ng/mL  THC       50 ng/mL  OXYCODONE      100 ng/mL    POCT alcohol breath test [[id number]]  (Normal) Resulted: 05/29/22 2335    Lab Status: Final result Updated: 05/29/22 2335     EXTBreath Alcohol 0 00                 No orders to display              Procedures  Procedures         ED Course  ED Course as of 05/30/22 0319   Sun May 29, 2022   2246 Pt seen and evaluated  Here for depression and SI   Mon May 30, 2022   0002 EXTBreath Alcohol: 0 00   0002 Rapid drug screen, urine  Pt is medically cleared for psychiatric admission    0020 Pt wants to go home   Pt states he just wanted to talk to someone and get resources to f/u  He does not feel that he is a danger to himself  He contracts for safety and insists that he will return if worse    0023 Pt provided w/ op resources for therapists and psychiatrists in the area                                  SBIRT 20yo+    Flowsheet Row Most Recent Value   SBIRT (23 yo +)    In order to provide better care to our patients, we are screening all of our patients for alcohol and drug use  Would it be okay to ask you these screening questions? Yes Filed at: 05/29/2022 2354   Initial Alcohol Screen: US AUDIT-C     1  How often do you have a drink containing alcohol? 1 Filed at: 05/29/2022 2359   2  How many drinks containing alcohol do you have on a typical day you are drinking? 1 Filed at: 05/29/2022 2355   3a  Male UNDER 65: How often do you have five or more drinks on one occasion? 0 Filed at: 05/29/2022 2355   3b  FEMALE Any Age, or MALE 65+: How often do you have 4 or more drinks on one occassion? 0 Filed at: 05/29/2022 2355   Audit-C Score 2 Filed at: 05/29/2022 2355   CATRACHITA: How many times in the past year have you    Used an illegal drug or used a prescription medication for non-medical reasons? Never Filed at: 05/29/2022 2355                    MDM    Disposition  Final diagnoses:   Depression     Time reflects when diagnosis was documented in both MDM as applicable and the Disposition within this note     Time User Action Codes Description Comment    5/30/2022 12:02 AM Janeth Canter Add Mosschela RAMIREZ,  R45 851] Depression with suicidal ideation     5/30/2022 12:22 AM Janeth Chouer Remove [Q61  Follett Sis Depression with suicidal ideation     5/30/2022 12:22 AM Janeth Barragan Add [F32  A] Depression       ED Disposition     ED Disposition   Discharge    Condition   Stable    Date/Time   Mon May 30, 2022 12:22 AM    Comment              Follow-up Information     Follow up With Specialties Details Why Contact Eddie Tate, DO Family Medicine Call today  [address]  [address]  [phone]            Discharge Medication List as of 5/30/2022 12:23 AM      CONTINUE these medications which have NOT CHANGED    Details   atoMOXetine (STRATTERA) 40 mg capsule Take 1 capsule (40 mg total) by mouth daily, Starting Thu 4/14/2022, Normal             No discharge procedures on file      PDMP Review     None          ED Provider  Electronically Signed by           Elysia Myers MD  05/30/22 2009

## 2022-07-09 ENCOUNTER — HOSPITAL ENCOUNTER (EMERGENCY)
Facility: HOSPITAL | Age: 22
Discharge: HOME | End: 2022-07-09
Attending: EMERGENCY MEDICINE | Admitting: EMERGENCY MEDICINE
Payer: COMMERCIAL

## 2022-07-09 ENCOUNTER — APPOINTMENT (EMERGENCY)
Dept: RADIOLOGY | Facility: HOSPITAL | Age: 22
End: 2022-07-09
Payer: COMMERCIAL

## 2022-07-09 VITALS
DIASTOLIC BLOOD PRESSURE: 69 MMHG | TEMPERATURE: 99 F | WEIGHT: 185 LBS | RESPIRATION RATE: 16 BRPM | HEIGHT: 71 IN | BODY MASS INDEX: 25.9 KG/M2 | OXYGEN SATURATION: 100 % | SYSTOLIC BLOOD PRESSURE: 135 MMHG | HEART RATE: 80 BPM

## 2022-07-09 DIAGNOSIS — M79.641 RIGHT HAND PAIN: ICD-10-CM

## 2022-07-09 DIAGNOSIS — S60.221A CONTUSION OF RIGHT HAND, INITIAL ENCOUNTER: Primary | ICD-10-CM

## 2022-07-09 PROCEDURE — 73130 X-RAY EXAM OF HAND: CPT

## 2022-07-09 PROCEDURE — 99284 EMERGENCY DEPT VISIT MOD MDM: CPT | Performed by: EMERGENCY MEDICINE

## 2022-07-09 PROCEDURE — 99283 EMERGENCY DEPT VISIT LOW MDM: CPT

## 2022-07-10 NOTE — ED PROVIDER NOTES
History  Chief Complaint   Patient presents with    Hand Injury     Pt reports hand pain x 20 hours  Pt states he punched a brick wall @ midnight  Pt reports a pain 5/10 w/ movement 3/10 w/o movement  Pt states pain is a sharp stabbing pain w/ movement  Pt denies any OTC medication  Pt reports slight bleeding yesterday from laceration but denies any bleeding today  Pt also reports some swelling and tingling feeling to the hand  Decreased slight sensation in R  Pinky, but ROM intact  24 yo male presenting to the ed for R hand pain after punching a wall last night  Describes pain in the R little and ring finger  Some tingling in R little finger but still able to feel  Small cut over R 5th knuckle  Tetanus up todate per patient  Denies any other issues or concerns at this time  Prior to Admission Medications   Prescriptions Last Dose Informant Patient Reported? Taking? atoMOXetine (STRATTERA) 40 mg capsule   No No   Sig: Take 1 capsule (40 mg total) by mouth daily   Patient not taking: Reported on 2022       Facility-Administered Medications: None       Past Medical History:   Diagnosis Date    ADHD (attention deficit hyperactivity disorder)        History reviewed  No pertinent surgical history  Family History   Problem Relation Age of Onset    No Known Problems Mother     No Known Problems Father      I have reviewed and agree with the history as documented  E-Cigarette/Vaping    E-Cigarette Use Never User      E-Cigarette/Vaping Substances     Social History     Tobacco Use    Smoking status: Former Smoker     Types: E-Cigarettes     Quit date: 2021     Years since quittin 2    Smokeless tobacco: Never Used   Vaping Use    Vaping Use: Never used   Substance Use Topics    Alcohol use: Yes     Comment: socially    Drug use: No       Review of Systems   Musculoskeletal: Positive for arthralgias (r hand)  All other systems reviewed and are negative        Physical Exam  Physical Exam  General: VS reviewed  Appears in NAD  awake, alert  Well-nourished, well-developed  Appears stated age  Speaking normally in full sentences  Head: Normocephalic, atraumatic  Eyes: EOM-I  No diplopia  No hyphema  No subconjunctival hemorrhages  Symmetrical lids  ENT: Atraumatic external nose and ears  MMM  No malocclusion  No stridor  Normal phonation  No drooling  Normal swallowing  Neck: No JVD  CV: No pallor noted  Lungs:   No tachypnea  No respiratory distress  MSK:   Tenderness over R 5th metacarpal  Full ROM  Sensation equal and intact in B/L hands  Cap refill <2 secs  Radial pulses 2+ B/L  No anatomical snuffbox tenderness R hand  Skin: Dry, intact  Neuro: Awake, alert, GCS15, CN II-XII grossly intact  Motor grossly intact  Psychiatric/Behavioral: Appropriate mood and affect   Exam: deferred      Vital Signs  ED Triage Vitals [07/09/22 2049]   Temperature Pulse Respirations Blood Pressure SpO2   99 °F (37 2 °C) 80 16 135/69 100 %      Temp Source Heart Rate Source Patient Position - Orthostatic VS BP Location FiO2 (%)   Oral Monitor Sitting Right arm --      Pain Score       5           Vitals:    07/09/22 2049   BP: 135/69   Pulse: 80   Patient Position - Orthostatic VS: Sitting         Visual Acuity      ED Medications  Medications - No data to display    Diagnostic Studies  Results Reviewed     None                 XR hand 3+ views RIGHT   ED Interpretation by Camden Polo DO (07/09 2241)   No acute fracture or dislocation noted on my interpretation                     Procedures  Procedures         ED Course  ED Course as of 07/09/22 2352   Sat Jul 09, 2022   2128 Pt declining pain medications or ice at this time  MDM  Number of Diagnoses or Management Options  Contusion of right hand, initial encounter  Right hand pain  Diagnosis management comments: No obvious fracture noted on Xray   Patient placed in static R wrist splint for comfort  Strict return precautions  Disposition  Final diagnoses:   Contusion of right hand, initial encounter   Right hand pain     Time reflects when diagnosis was documented in both MDM as applicable and the Disposition within this note     Time User Action Codes Description Comment    7/9/2022 10:41 PM Charlotte Pride Add [L92 [id number]] Contusion of right hand, initial encounter     7/9/2022 10:41 PM Charlotte Pride Add [G12 [id number]] Right hand pain       ED Disposition     ED Disposition   Discharge    Condition   Stable    Date/Time   Sat Jul 9, 2022 10:41 PM    Comment   Viva Alter discharge to home/self care  Follow-up Information     Follow up With Specialties Details Why Contact Info Additional Information    Marii Dunaway DO Family Medicine   [address] [facility]  [address]    [address]  [facility] Emergency Department Emergency Medicine Go to  As needed, If symptoms worsen [address]  [place] [phone] [facility] Emergency Department, [address] [facility] [address], [address] [facility]          Discharge Medication List as of 7/9/2022 10:42 PM      CONTINUE these medications which have NOT CHANGED    Details   atoMOXetine (STRATTERA) 40 mg capsule Take 1 capsule (40 mg total) by mouth daily, Starting Thu 4/14/2022, Normal             No discharge procedures on file      PDMP Review     None          ED Provider  Electronically Signed by           Zuhair De La Cruz DO  07/09/22 6535

## 2022-07-20 ENCOUNTER — APPOINTMENT (OUTPATIENT)
Dept: LAB | Facility: CLINIC | Age: 22
End: 2022-07-20
Payer: COMMERCIAL

## 2022-07-20 DIAGNOSIS — Z79.899 ENCOUNTER FOR LONG-TERM (CURRENT) USE OF OTHER MEDICATIONS: ICD-10-CM

## 2022-07-20 LAB
ALBUMIN SERPL BCP-MCNC: 4 G/DL (ref 3.5–5)
ALP SERPL-CCNC: 76 U/L (ref 46–116)
ALT SERPL W P-5'-P-CCNC: 21 U/L (ref 12–78)
ANION GAP SERPL CALCULATED.3IONS-SCNC: 7 MMOL/L (ref 4–13)
AST SERPL W P-5'-P-CCNC: 14 U/L (ref 5–45)
BASOPHILS # BLD AUTO: 0.06 THOUSANDS/ΜL (ref 0–0.1)
BASOPHILS NFR BLD AUTO: 1 % (ref 0–1)
BILIRUB SERPL-MCNC: 0.27 MG/DL (ref 0.2–1)
BUN SERPL-MCNC: 15 MG/DL (ref 5–25)
CALCIUM SERPL-MCNC: 9 MG/DL (ref 8.3–10.1)
CHLORIDE SERPL-SCNC: 110 MMOL/L (ref 96–108)
CHOLEST SERPL-MCNC: 112 MG/DL
CO2 SERPL-SCNC: 25 MMOL/L (ref 21–32)
CREAT SERPL-MCNC: 0.95 MG/DL (ref 0.6–1.3)
EOSINOPHIL # BLD AUTO: 0.17 THOUSAND/ΜL (ref 0–0.61)
EOSINOPHIL NFR BLD AUTO: 2 % (ref 0–6)
ERYTHROCYTE [DISTWIDTH] IN BLOOD BY AUTOMATED COUNT: 12.1 % (ref 11.6–15.1)
EST. AVERAGE GLUCOSE BLD GHB EST-MCNC: 97 MG/DL
GFR SERPL CREATININE-BSD FRML MDRD: 113 ML/MIN/1.73SQ M
GLUCOSE P FAST SERPL-MCNC: 96 MG/DL (ref 65–99)
HBA1C MFR BLD: 5 %
HCT VFR BLD AUTO: 44.4 % (ref 36.5–49.3)
HDLC SERPL-MCNC: 33 MG/DL
HGB BLD-MCNC: 14.9 G/DL (ref 12–17)
IMM GRANULOCYTES # BLD AUTO: 0.01 THOUSAND/UL (ref 0–0.2)
IMM GRANULOCYTES NFR BLD AUTO: 0 % (ref 0–2)
LDLC SERPL CALC-MCNC: 67 MG/DL (ref 0–100)
LYMPHOCYTES # BLD AUTO: 2.27 THOUSANDS/ΜL (ref 0.6–4.47)
LYMPHOCYTES NFR BLD AUTO: 30 % (ref 14–44)
MCH RBC QN AUTO: 30.4 PG (ref 26.8–34.3)
MCHC RBC AUTO-ENTMCNC: 33.6 G/DL (ref 31.4–37.4)
MCV RBC AUTO: 91 FL (ref 82–98)
MONOCYTES # BLD AUTO: 0.58 THOUSAND/ΜL (ref 0.17–1.22)
MONOCYTES NFR BLD AUTO: 8 % (ref 4–12)
NEUTROPHILS # BLD AUTO: 4.58 THOUSANDS/ΜL (ref 1.85–7.62)
NEUTS SEG NFR BLD AUTO: 59 % (ref 43–75)
NONHDLC SERPL-MCNC: 79 MG/DL
NRBC BLD AUTO-RTO: 0 /100 WBCS
PLATELET # BLD AUTO: 367 THOUSANDS/UL (ref 149–390)
PMV BLD AUTO: 8.9 FL (ref 8.9–12.7)
POTASSIUM SERPL-SCNC: 4.2 MMOL/L (ref 3.5–5.3)
PROT SERPL-MCNC: 8 G/DL (ref 6.4–8.4)
RBC # BLD AUTO: 4.9 MILLION/UL (ref 3.88–5.62)
SODIUM SERPL-SCNC: 142 MMOL/L (ref 135–147)
TRIGL SERPL-MCNC: 58 MG/DL
WBC # BLD AUTO: 7.67 THOUSAND/UL (ref 4.31–10.16)

## 2022-07-20 PROCEDURE — 80053 COMPREHEN METABOLIC PANEL: CPT

## 2022-07-20 PROCEDURE — 36415 COLL VENOUS BLD VENIPUNCTURE: CPT

## 2022-07-20 PROCEDURE — 80061 LIPID PANEL: CPT

## 2022-07-20 PROCEDURE — 85025 COMPLETE CBC W/AUTO DIFF WBC: CPT

## 2022-07-20 PROCEDURE — 83036 HEMOGLOBIN GLYCOSYLATED A1C: CPT

## 2022-08-01 ENCOUNTER — HOSPITAL ENCOUNTER (EMERGENCY)
Facility: HOSPITAL | Age: 22
Discharge: HOME | End: 2022-08-01
Attending: INTERNAL MEDICINE
Payer: COMMERCIAL

## 2022-08-01 ENCOUNTER — APPOINTMENT (EMERGENCY)
Dept: RADIOLOGY | Facility: HOSPITAL | Age: 22
End: 2022-08-01
Payer: COMMERCIAL

## 2022-08-01 VITALS
DIASTOLIC BLOOD PRESSURE: 65 MMHG | HEART RATE: 78 BPM | SYSTOLIC BLOOD PRESSURE: 141 MMHG | RESPIRATION RATE: 18 BRPM | TEMPERATURE: 98 F | OXYGEN SATURATION: 100 %

## 2022-08-01 DIAGNOSIS — S60.229A HAND CONTUSION: ICD-10-CM

## 2022-08-01 DIAGNOSIS — S63.90XA HAND SPRAIN: ICD-10-CM

## 2022-08-01 DIAGNOSIS — M79.641 RIGHT HAND PAIN: Primary | ICD-10-CM

## 2022-08-01 PROCEDURE — 99283 EMERGENCY DEPT VISIT LOW MDM: CPT

## 2022-08-01 PROCEDURE — 73130 X-RAY EXAM OF HAND: CPT

## 2022-08-01 PROCEDURE — 99284 EMERGENCY DEPT VISIT MOD MDM: CPT

## 2022-08-01 NOTE — ED PROVIDER NOTES
History  Chief Complaint   Patient presents with    Hand Pain     Patient reports right hand pain after punching a wall a few weeks ago  Patient was seen here and evaluated but reports that he has increased pain      Patient is a 25 year male who reports that he is returning today for re-evaluation of right hand pain after he punched a wall "a few weeks ago " Patient denies re-injuring his right hand  Patient has not followed up with Orthopedics  Patient denies taking Motrin/Tylenol  Pt reports continuing to have right hand pain between his 4th and 5th mcp, dorsal aspect  Pt has small area of localized swelling  Pt has full ROM of his right hand  Pt is has n/v intact all distal digits, and on hand, wrist and elbow  Pt has <2second capillary refill of all digits  Pt has no wounds  Pt has no erythema  Prior to Admission Medications   Prescriptions Last Dose Informant Patient Reported? Taking? atoMOXetine (STRATTERA) 40 mg capsule   No No   Sig: Take 1 capsule (40 mg total) by mouth daily   Patient not taking: Reported on 2022       Facility-Administered Medications: None       Past Medical History:   Diagnosis Date    ADHD (attention deficit hyperactivity disorder)        History reviewed  No pertinent surgical history  Family History   Problem Relation Age of Onset    No Known Problems Mother     No Known Problems Father      I have reviewed and agree with the history as documented  E-Cigarette/Vaping    E-Cigarette Use Never User      E-Cigarette/Vaping Substances     Social History     Tobacco Use    Smoking status: Former Smoker     Types: E-Cigarettes     Quit date: 2021     Years since quittin 3    Smokeless tobacco: Never Used   Vaping Use    Vaping Use: Never used   Substance Use Topics    Alcohol use: Yes     Comment: socially    Drug use: No       Review of Systems   Constitutional: Negative  HENT: Negative  Eyes: Negative  Respiratory: Negative  Cardiovascular: Negative  Gastrointestinal: Negative  Endocrine: Negative  Genitourinary: Negative  Musculoskeletal: Negative  Skin: Negative  Allergic/Immunologic: Negative  Neurological: Negative  Hematological: Negative  Psychiatric/Behavioral: Negative  All other systems reviewed and are negative  Physical Exam  Physical Exam  Vitals and nursing note reviewed  Constitutional:       Appearance: Normal appearance  He is normal weight  HENT:      Head: Normocephalic  Right Ear: External ear normal       Left Ear: External ear normal       Nose: Nose normal       Mouth/Throat:      Mouth: Mucous membranes are moist    Cardiovascular:      Pulses: Normal pulses  Pulmonary:      Effort: Pulmonary effort is normal    Abdominal:      General: Abdomen is flat  Musculoskeletal:         General: Tenderness and signs of injury present  Right hand: Tenderness present  No swelling, deformity, lacerations or bony tenderness  Normal range of motion  Normal strength  Normal sensation  There is no disruption of two-point discrimination  Normal capillary refill  Normal pulse  Left hand: Normal         Hands:       Comments: Sensation equal and intact in B/L hands  Cap refill <2 secs  Radial pulses 2+ B/L  No anatomical snuffbox tenderness R hand   Skin:     General: Skin is warm  Capillary Refill: Capillary refill takes less than 2 seconds  Neurological:      General: No focal deficit present  Mental Status: He is alert           Vital Signs  ED Triage Vitals [08/01/22 1732]   Temperature Pulse Respirations Blood Pressure SpO2   98 °F (36 7 °C) 78 18 141/65 100 %      Temp Source Heart Rate Source Patient Position - Orthostatic VS BP Location FiO2 (%)   Oral -- -- -- --      Pain Score       4           Vitals:    08/01/22 1732   BP: 141/65   Pulse: 78         Visual Acuity      ED Medications  Medications - No data to display    Diagnostic Studies  Results Reviewed     None                 XR hand 3+ views RIGHT   ED Interpretation by YOEL Anderson (08/01 1803)   No acute osseous abnormality                 Procedures  Procedures         ED Course                               SBIRT 20yo+    Flowsheet Row Most Recent Value   SBIRT (25 yo +)    In order to provide better care to our patients, we are screening all of our patients for alcohol and drug use  Would it be okay to ask you these screening questions? No Filed at: 08/01/2022 1820                    MDM  Number of Diagnoses or Management Options  Hand contusion  Hand sprain  Right hand pain  Diagnosis management comments: DDx: right hand contusion, right hand fracture  Will obtain repeat imagining as patient has not followed up with Orthopedics as directed  No concerns for infection  Pt has full ROM of all digits  No anatomic snuff box tenderness  Pt appears in no acute distress  No wrist or elbow pain  No pain with ulnar deviation  No acute osseous abnormalities were appreciated on x-ray  Patient provided with information for [facility]  [facility]  Pt provided with orthopedic phone number to follow up  Discussed purpose of follow up with orthopedics  Pt aware to continue taking tylenol/motrin for pain  Discussed return precautions  Discussed contusions can take up to 4-6 weeks to heal           Amount and/or Complexity of Data Reviewed  Tests in the radiology section of CPT®: ordered and reviewed    Risk of Complications, Morbidity, and/or Mortality  General comments: Pt arrived for repeat evaluation of his right hand injury that occurred approximately one month ago  No acute osseous abnormality appreciated  Ortho follow up provided  Reviewed reasons to return to ed  Patient verbalized understanding of diagnosis and agreement with discharge plan of care as well as understanding of reasons to return to ed       Patient Progress  Patient progress: stable      Disposition  Final diagnoses:   Right hand pain Hand sprain   Hand contusion     Time reflects when diagnosis was documented in both MDM as applicable and the Disposition within this note     Time User Action Codes Description Comment    8/1/2022  6:06 PM Jaime Galeana [L00 780] Right hand pain     8/1/2022  6:06 PM Cande Alvareset, [address] [place]  sprain     8/1/2022  6:07 PM Cande Damico, [address] Hand contusion       ED Disposition     ED Disposition   Discharge    Condition   Stable    Date/Time   Mon Aug 1, 2022  6:06 PM    Comment   Viva Alter discharge to home/self care  Follow-up Information     Follow up With Specialties Details Why Contact Info Additional [address] Emergency Department Emergency Medicine Go to  If symptoms worsen [address] [place] [phone]  [facility] Emergency Department, [address] [place] [address], [address]ney [address] Specialists [address] [place] [address] Orthopedic Surgery Schedule an appointment as soon as possible for a visit in 2 days For further evaluation of symptoms [address] [place] [address] [address] [place] [phone]  [address] [place] [address] Specialists [address] [facility], [address] [facility], [place], [address] [place] [address] 70          Discharge Medication List as of 8/1/2022  6:08 PM      CONTINUE these medications which have NOT CHANGED    Details   atoMOXetine (STRATTERA) 40 mg capsule Take 1 capsule (40 mg total) by mouth daily, Starting Thu 4/14/2022, Normal             No discharge procedures on file      PDMP Review     None          ED Provider  Electronically Signed by           YOEL Marsh  08/01/22 6013

## 2022-08-14 ENCOUNTER — OFFICE VISIT (OUTPATIENT)
Dept: URGENT CARE | Facility: CLINIC | Age: 22
End: 2022-08-14
Payer: COMMERCIAL

## 2022-08-14 VITALS
HEART RATE: 112 BPM | TEMPERATURE: 100 F | WEIGHT: 160 LBS | HEIGHT: 71 IN | OXYGEN SATURATION: 97 % | BODY MASS INDEX: 22.4 KG/M2 | RESPIRATION RATE: 18 BRPM

## 2022-08-14 DIAGNOSIS — J02.9 ACUTE PHARYNGITIS, UNSPECIFIED ETIOLOGY: Primary | ICD-10-CM

## 2022-08-14 DIAGNOSIS — B37.0 ORAL CANDIDIASIS: ICD-10-CM

## 2022-08-14 LAB
SARS-COV-2 AG UPPER RESP QL IA: NEGATIVE
VALID CONTROL: NORMAL

## 2022-08-14 PROCEDURE — 99213 OFFICE O/P EST LOW 20 MIN: CPT | Performed by: PHYSICIAN ASSISTANT

## 2022-08-14 PROCEDURE — 87811 SARS-COV-2 COVID19 W/OPTIC: CPT | Performed by: PHYSICIAN ASSISTANT

## 2022-08-14 RX ORDER — CARIPRAZINE 1.5 MG/1
1.5 CAPSULE, GELATIN COATED ORAL DAILY
COMMUNITY
Start: 2022-08-02

## 2022-08-14 RX ORDER — AMOXICILLIN 875 MG/1
875 TABLET, COATED ORAL 2 TIMES DAILY
Qty: 20 TABLET | Refills: 0 | Status: SHIPPED | OUTPATIENT
Start: 2022-08-14 | End: 2022-08-24

## 2022-08-14 NOTE — PROGRESS NOTES
[address] [facility]        NAME: Suleiman Linn is a 25 y o  male  : 2000    MRN: [id number]  DATE: 2022  TIME: 2:55 PM    Assessment and Plan   Acute pharyngitis, unspecified etiology [J02 9]  1  Acute pharyngitis, unspecified etiology  Poct Covid 19 Rapid Antigen Test    amoxicillin (AMOXIL) 875 mg tablet   2  Oral candidiasis  nystatin (MYCOSTATIN) 500,000 units/5 mL suspension         Patient Instructions       Follow up with PCP in 3-5 days  Proceed to  ER if symptoms worsen  Chief Complaint     Chief Complaint   Patient presents with    Sore Throat    Fever    Headache    Fatigue    Nausea    Vomiting    Nasal Congestion     Started last Saturday          History of Present Illness       Patient is a 21 y/o/m presenting [facility] with fever, nasal congestion, headache, fatigue, sore throat, N/V  Patient reports symptoms began about 1 week ago  Current temp of 100  Pt reports feeling his fever began yesterday  Pt reports feeling cold  Sore Throat   This is a new problem  The current episode started in the past 7 days  The problem has been waxing and waning  The maximum temperature recorded prior to his arrival was 100 4 - 100 9 F  The pain is mild  Associated symptoms include congestion, coughing, headaches and vomiting  Pertinent negatives include no abdominal pain, ear pain or shortness of breath  Fever  Associated symptoms include congestion, coughing, fatigue, headaches, nausea, a sore throat and vomiting  Pertinent negatives include no abdominal pain, arthralgias, chest pain, chills, fever or rash  Headache  Fatigue  Associated symptoms include congestion, coughing, fatigue, headaches, nausea, a sore throat and vomiting  Pertinent negatives include no abdominal pain, arthralgias, chest pain, chills, fever or rash  Nausea  Associated symptoms include congestion, coughing, fatigue, headaches, nausea, a sore throat and vomiting   Pertinent negatives include no abdominal pain, arthralgias, chest pain, chills, fever or rash  Vomiting   Associated symptoms include coughing and headaches  Pertinent negatives include no abdominal pain, arthralgias, chest pain, chills or fever  Review of Systems   Review of Systems   Constitutional: Positive for fatigue  Negative for chills and fever  HENT: Positive for congestion, rhinorrhea and sore throat  Negative for ear pain  Eyes: Negative for pain and visual disturbance  Respiratory: Positive for cough  Negative for shortness of breath  Cardiovascular: Negative for chest pain and palpitations  Gastrointestinal: Positive for nausea and vomiting  Negative for abdominal pain  Genitourinary: Negative for dysuria and hematuria  Musculoskeletal: Negative for arthralgias and back pain  Skin: Negative for color change and rash  Neurological: Positive for headaches  Negative for seizures and syncope  All other systems reviewed and are negative          Current Medications       Current Outpatient Medications:     amoxicillin (AMOXIL) 875 mg tablet, Take 1 tablet (875 mg total) by mouth 2 (two) times a day for 10 days, Disp: 20 tablet, Rfl: 0    nystatin (MYCOSTATIN) 500,000 units/5 mL suspension, Apply 5 mL (500,000 Units total) to the mouth or throat 4 (four) times a day, Disp: 473 mL, Rfl: 0    Vraylar 1 5 MG capsule, Take 1 5 mg by mouth daily, Disp: , Rfl:     atoMOXetine (STRATTERA) 40 mg capsule, Take 1 capsule (40 mg total) by mouth daily (Patient not taking: Reported on 4/26/2022 ), Disp: 30 capsule, Rfl: 0    Current Allergies     Allergies as of 08/14/2022    (No Known Allergies)            The following portions of the patient's history were reviewed and updated as appropriate: allergies, current medications, past family history, past medical history, past social history, past surgical history and problem list      Past Medical History:   Diagnosis Date    ADHD (attention deficit hyperactivity disorder)        History reviewed  No pertinent surgical history  Family History   Problem Relation Age of Onset    No Known Problems Mother     No Known Problems Father          Medications have been verified  Objective   Pulse (!) 112   Temp 100 °F (37 8 °C) (Temporal)   Resp 18   Ht 5' 11" (1 803 m)   Wt 72 6 kg (160 lb)   SpO2 97%   BMI 22 32 kg/m²   No LMP for male patient  Physical Exam     Physical Exam  Constitutional:       Appearance: Normal appearance  He is normal weight  HENT:      Head: Normocephalic and atraumatic  Nose: Congestion present  Mouth/Throat:      Mouth: Mucous membranes are moist       Tongue: Lesions present  Pharynx: Pharyngeal swelling, oropharyngeal exudate and posterior oropharyngeal erythema present  Tonsils: Tonsillar exudate present  Eyes:      Extraocular Movements: Extraocular movements intact  Conjunctiva/sclera: Conjunctivae normal       Pupils: Pupils are equal, round, and reactive to light  Cardiovascular:      Rate and Rhythm: Normal rate and regular rhythm  Pulmonary:      Effort: Pulmonary effort is normal    Musculoskeletal:         General: Normal range of motion  Cervical back: Normal range of motion and neck supple  Skin:     General: Skin is warm and dry  Neurological:      General: No focal deficit present  Mental Status: He is alert and oriented to person, place, and time     Psychiatric:         Mood and Affect: Mood normal          Behavior: Behavior normal

## 2022-08-16 ENCOUNTER — OFFICE VISIT (OUTPATIENT)
Dept: URGENT CARE | Facility: CLINIC | Age: 22
End: 2022-08-16
Payer: COMMERCIAL

## 2022-08-16 VITALS
HEIGHT: 71 IN | TEMPERATURE: 98.3 F | DIASTOLIC BLOOD PRESSURE: 55 MMHG | BODY MASS INDEX: 22.4 KG/M2 | OXYGEN SATURATION: 100 % | HEART RATE: 92 BPM | SYSTOLIC BLOOD PRESSURE: 100 MMHG | RESPIRATION RATE: 18 BRPM | WEIGHT: 160 LBS

## 2022-08-16 DIAGNOSIS — R53.83 FATIGUE, UNSPECIFIED TYPE: ICD-10-CM

## 2022-08-16 DIAGNOSIS — J02.9 SORE THROAT: Primary | ICD-10-CM

## 2022-08-16 LAB — S PYO AG THROAT QL: NEGATIVE

## 2022-08-16 PROCEDURE — 99213 OFFICE O/P EST LOW 20 MIN: CPT

## 2022-08-16 PROCEDURE — 87070 CULTURE OTHR SPECIMN AEROBIC: CPT

## 2022-08-16 PROCEDURE — 87880 STREP A ASSAY W/OPTIC: CPT

## 2022-08-16 RX ORDER — PREDNISONE 10 MG/1
TABLET ORAL
Qty: 6 TABLET | Refills: 0 | Status: SHIPPED | OUTPATIENT
Start: 2022-08-16

## 2022-08-16 NOTE — PROGRESS NOTES
[facility] [facility]        NAME: Fredy Macdonald is a 25 y o  male  : 2000    MRN: [id number]  DATE: 2022  TIME: 3:50 PM    Assessment and Plan   Sore throat [J02 9]  1  Sore throat  predniSONE 10 mg tablet    CBC and differential    Mononucleosis screen    CANCELED: Mononucleosis screen   2  Fatigue, unspecified type  Mononucleosis screen     Rapid strep was negative  A throat culture was sent  I prescribed prednisone and advised the patient to continue taking the amoxicillin  He has no PCP and is requesting labs for Mono  Patient Instructions     Continue taking the amoxicillin  Finish the entire course of antibiotics  Take the steroids in the morning with food  Use a warm mist humidifier or vaporizer  Hot tea with honey  Warm saline gargle or throat lozenge may help with a sore throat  OTC saline nasal sprays   Drink plenty of fluids  The rapid strep was negative  A throat culture was sent and will take two days  Follow up with PCP in 3-5 days  Proceed to  ER if symptoms worsen  Chief Complaint     Chief Complaint   Patient presents with    Sore Throat     Patient presents with a sore throat that started a week ago  History of Present Illness       Patient is a 22YOM presenting with worsening sore throat, and fatigue  He was seen on 22 and placed on amoxicillin  He had a negative covid swab done at the time of the visit  He denies any fever, chills, cough ,shortness of breath, chest pain, abdominal pain, n/v/d  He took 2 doses of amoxicillin  He was concerned he may have Mono  Sore Throat   Associated symptoms include trouble swallowing  Pertinent negatives include no abdominal pain, coughing, diarrhea, headaches, shortness of breath or vomiting  Review of Systems   Review of Systems   Constitutional: Positive for fatigue and fever  Negative for activity change, appetite change and chills  HENT: Positive for sore throat and trouble swallowing   Negative for facial swelling, rhinorrhea, sinus pain and voice change  Respiratory: Negative for cough, shortness of breath and wheezing  Cardiovascular: Negative for chest pain and palpitations  Gastrointestinal: Negative for abdominal pain, diarrhea, nausea and vomiting  Musculoskeletal: Negative for arthralgias  Neurological: Negative for dizziness, weakness, numbness and headaches  All other systems reviewed and are negative  Current Medications       Current Outpatient Medications:     amoxicillin (AMOXIL) 875 mg tablet, Take 1 tablet (875 mg total) by mouth 2 (two) times a day for 10 days, Disp: 20 tablet, Rfl: 0    nystatin (MYCOSTATIN) 500,000 units/5 mL suspension, Apply 5 mL (500,000 Units total) to the mouth or throat 4 (four) times a day, Disp: 473 mL, Rfl: 0    predniSONE 10 mg tablet, Take 6 pills day 1, then 5 pills day 2, 4 pills day 3, 3 pills day 4, 2 pills day 5, 1 pill day 6,, Disp: 6 tablet, Rfl: 0    Vraylar 1 5 MG capsule, Take 1 5 mg by mouth daily, Disp: , Rfl:     atoMOXetine (STRATTERA) 40 mg capsule, Take 1 capsule (40 mg total) by mouth daily (Patient not taking: No sig reported), Disp: 30 capsule, Rfl: 0    Current Allergies     Allergies as of 08/16/2022    (No Known Allergies)            The following portions of the patient's history were reviewed and updated as appropriate: allergies, current medications, past family history, past medical history, past social history, past surgical history and problem list      Past Medical History:   Diagnosis Date    ADHD (attention deficit hyperactivity disorder)        History reviewed  No pertinent surgical history  Family History   Problem Relation Age of Onset    No Known Problems Mother     No Known Problems Father          Medications have been verified          Objective   /55   Pulse 92   Temp 98 3 °F (36 8 °C) (Temporal)   Resp 18   Ht 5' 11" (1 803 m)   Wt 72 6 kg (160 lb)   SpO2 100%   BMI 22 32 kg/m²        Physical Exam     Physical Exam  Vitals and nursing note reviewed  Constitutional:       General: He is not in acute distress  Appearance: He is well-developed and normal weight  He is not ill-appearing or toxic-appearing  HENT:      Right Ear: Tympanic membrane normal       Left Ear: Tympanic membrane normal       Mouth/Throat:      Pharynx: Oropharynx is clear  Posterior oropharyngeal erythema present  No oropharyngeal exudate  Tonsils: No tonsillar exudate  1+ on the right  1+ on the left  Cardiovascular:      Rate and Rhythm: Normal rate and regular rhythm  Pulmonary:      Effort: Pulmonary effort is normal       Breath sounds: Normal breath sounds  Abdominal:      Palpations: Abdomen is soft  Tenderness: There is no abdominal tenderness  There is no rebound  Lymphadenopathy:      Cervical: Cervical adenopathy present  Skin:     General: Skin is warm and dry  Capillary Refill: Capillary refill takes less than 2 seconds  Neurological:      General: No focal deficit present  Mental Status: He is alert and oriented to person, place, and time

## 2022-08-16 NOTE — PATIENT INSTRUCTIONS
Continue taking the amoxicillin  Finish the entire course of antibiotics  Take the steroids in the morning with food  Use a warm mist humidifier or vaporizer  Hot tea with honey  Warm saline gargle or throat lozenge may help with a sore throat  OTC saline nasal sprays   Drink plenty of fluids  The rapid strep was negative  A throat culture was sent and will take two days

## 2022-08-18 LAB — BACTERIA THROAT CULT: NORMAL

## 2022-09-26 ENCOUNTER — HOSPITAL ENCOUNTER (EMERGENCY)
Facility: HOSPITAL | Age: 22
Discharge: HOME | End: 2022-09-26
Attending: EMERGENCY MEDICINE
Payer: COMMERCIAL

## 2022-09-26 VITALS
TEMPERATURE: 98.8 F | RESPIRATION RATE: 18 BRPM | WEIGHT: 175 LBS | HEART RATE: 98 BPM | HEIGHT: 71 IN | OXYGEN SATURATION: 98 % | BODY MASS INDEX: 24.5 KG/M2

## 2022-09-26 DIAGNOSIS — Z20.822 PERSON UNDER INVESTIGATION FOR COVID-19: ICD-10-CM

## 2022-09-26 DIAGNOSIS — J06.9 URI (UPPER RESPIRATORY INFECTION): Primary | ICD-10-CM

## 2022-09-26 LAB — SARS-COV-2 RNA RESP QL NAA+PROBE: NEGATIVE

## 2022-09-26 PROCEDURE — 87635 SARS-COV-2 COVID-19 AMP PRB: CPT | Performed by: EMERGENCY MEDICINE

## 2022-09-26 PROCEDURE — 99283 EMERGENCY DEPT VISIT LOW MDM: CPT

## 2022-09-26 PROCEDURE — 99284 EMERGENCY DEPT VISIT MOD MDM: CPT | Performed by: EMERGENCY MEDICINE

## 2022-09-26 NOTE — DISCHARGE INSTRUCTIONS
Take Vitamin D 2000 IU daily, Vitamin C 1000mg twice daily and a multivitamin with zinc in it as it has been shown to help COVID symptoms  These can all be found over the counter  You were considered safe to be discharged with COVID however if you get increasingly short of breath or have any chest pain, please return to the emergency department  It may be helpful for you to get a pulse oximeter from a pharmacy  If your oxygen level is 92% or less a while at rest, please return to the ER  Anybody that you have been around especially without a mask recently should also quarantine and get tested if they have symptoms  You should plan to quarantine for a minimum of 5 days  As long as your symptoms remain mild, after that you may return from quarantine as long as you wear a mask for another 5 days  Call your family doctor tomorrow morning to discuss in further detail and schedule a follow-up virtual visit

## 2022-09-26 NOTE — ED PROVIDER NOTES
History  No chief complaint on file  Year old male with no significant past medical history presents with mild frontal headache, nasal congestion and cough  Has had close contact with a COVID positive individual   He presents requesting COVID test for work  Headache  Pain location:  Generalized  Quality:  Dull  Radiates to:  Does not radiate  Severity currently:  Unable to specify  Severity at highest:  Unable to specify  Onset quality:  Gradual  Timing:  Constant  Progression:  Unchanged  Chronicity:  New  Associated symptoms: cough and sore throat    Associated symptoms: no abdominal pain, no back pain, no congestion, no diarrhea, no eye pain, no fever, no nausea, no numbness, no vomiting and no weakness        Prior to Admission Medications   Prescriptions Last Dose Informant Patient Reported? Taking? Vraylar 1 5 MG capsule   Yes No   Sig: Take 1 5 mg by mouth daily   atoMOXetine (STRATTERA) 40 mg capsule   No No   Sig: Take 1 capsule (40 mg total) by mouth daily   Patient not taking: No sig reported   nystatin (MYCOSTATIN) 500,000 units/5 mL suspension   No No   Sig: Apply 5 mL (500,000 Units total) to the mouth or throat 4 (four) times a day   predniSONE 10 mg tablet   No No   Sig: Take 6 pills day 1, then 5 pills day 2, 4 pills day 3, 3 pills day 4, 2 pills day 5, 1 pill day 6,      Facility-Administered Medications: None       Past Medical History:   Diagnosis Date    ADHD (attention deficit hyperactivity disorder)        History reviewed  No pertinent surgical history  Family History   Problem Relation Age of Onset    No Known Problems Mother     No Known Problems Father      I have reviewed and agree with the history as documented      E-Cigarette/Vaping    E-Cigarette Use Never User      E-Cigarette/Vaping Substances     Social History     Tobacco Use    Smoking status: Former Smoker     Types: E-Cigarettes     Quit date: 2021     Years since quittin 4    Smokeless tobacco: Never Used   Vaping Use    Vaping Use: Never used   Substance Use Topics    Alcohol use: Yes     Comment: socially    Drug use: No       Review of Systems   Constitutional: Positive for chills  Negative for fever  HENT: Positive for sore throat  Negative for congestion, nosebleeds and rhinorrhea  Eyes: Negative for pain and visual disturbance  Respiratory: Positive for cough  Negative for wheezing  Cardiovascular: Negative for chest pain and leg swelling  Gastrointestinal: Negative for abdominal distention, abdominal pain, diarrhea, nausea and vomiting  Genitourinary: Negative for dysuria and frequency  Musculoskeletal: Negative for back pain and joint swelling  Skin: Negative for rash and wound  Neurological: Positive for headaches  Negative for weakness and numbness  Psychiatric/Behavioral: Negative for decreased concentration and suicidal ideas  Physical Exam  Physical Exam  Vitals and nursing note reviewed  Constitutional:       Appearance: He is well-developed  HENT:      Head: Normocephalic and atraumatic  Eyes:      Conjunctiva/sclera: Conjunctivae normal       Pupils: Pupils are equal, round, and reactive to light  Neck:      Trachea: No tracheal deviation  Cardiovascular:      Rate and Rhythm: Normal rate and regular rhythm  Heart sounds: Normal heart sounds  No murmur heard  Pulmonary:      Effort: Pulmonary effort is normal  No respiratory distress  Breath sounds: Normal breath sounds  No wheezing or rales  Abdominal:      General: Bowel sounds are normal  There is no distension  Palpations: Abdomen is soft  Tenderness: There is no abdominal tenderness  Musculoskeletal:         General: No deformity  Cervical back: Normal range of motion and neck supple  No rigidity or tenderness  Skin:     General: Skin is warm and dry  Capillary Refill: Capillary refill takes less than 2 seconds     Neurological:      Mental Status: He is alert and oriented to person, place, and time  Sensory: No sensory deficit  Psychiatric:         Judgment: Judgment normal          Vital Signs  ED Triage Vitals [09/26/22 1935]   Temperature Pulse Respirations BP SpO2   98 8 °F (37 1 °C) 98 18 -- 98 %      Temp Source Heart Rate Source Patient Position - Orthostatic VS BP Location FiO2 (%)   Tympanic Monitor -- -- --      Pain Score       4           Vitals:    09/26/22 1935   Pulse: 98         Visual Acuity      ED Medications  Medications - No data to display    Diagnostic Studies  Results Reviewed     Procedure Component Value Units Date/Time    COVID only [[id number]] Collected: 09/26/22 1943    Lab Status: In process Specimen: Nares from Nose Updated: 09/26/22 1945                 No orders to display              Procedures  Procedures         ED Course                               SBIRT 22yo+    Flowsheet Row Most Recent Value   SBIRT (25 yo +)    In order to provide better care to our patients, we are screening all of our patients for alcohol and drug use  Would it be okay to ask you these screening questions? Yes Filed at: 09/26/2022 1943   Initial Alcohol Screen: US AUDIT-C     1  How often do you have a drink containing alcohol? 0 Filed at: 09/26/2022 1943   2  How many drinks containing alcohol do you have on a typical day you are drinking? 0 Filed at: 09/26/2022 1943   3a  Male UNDER 65: How often do you have five or more drinks on one occasion? 0 Filed at: 09/26/2022 1943   3b  FEMALE Any Age, or MALE 65+: How often do you have 4 or more drinks on one occassion? 0 Filed at: 09/26/2022 1943   Audit-C Score 0 Filed at: 09/26/2022 1943   CATRACHITA: How many times in the past year have you    Used an illegal drug or used a prescription medication for non-medical reasons?  Never Filed at: 09/26/2022 1943                    MDM  Number of Diagnoses or Management Options  Person under investigation for COVID-19: new and requires workup  URI (upper respiratory infection): new and requires workup  Diagnosis management comments: Patient was symptoms concerning for COVID  No evidence of bacterial illness on this patient  No nuchal rigidity  Patient declined symptomatic treatment, requesting COVID test and work note    Patient given return precautions, return to work for COVID cup policy       Amount and/or Complexity of Data Reviewed  Review and summarize past medical records: yes  Independent visualization of images, tracings, or specimens: yes    Risk of Complications, Morbidity, and/or Mortality  Presenting problems: moderate  Diagnostic procedures: minimal  Management options: minimal        Disposition  Final diagnoses:   URI (upper respiratory infection)   Person under investigation for COVID-19     Time reflects when diagnosis was documented in both MDM as applicable and the Disposition within this note     Time User Action Codes Description Comment    9/26/2022  7:40 PM Nick Lee Add [J06 9] URI (upper respiratory infection)     9/26/2022  7:40 PM Nick Lee Add [Z20 [id number]] Person under investigation for COVID-19       ED Disposition     ED Disposition   Discharge    Condition   Stable    Date/Time   Mon Sep 26, 2022  7:40 PM    Comment   Precious Lawton discharge to home/self care  Follow-up Information     Follow up With Specialties Details Why Contact Info    Joanne Aranda DO Family Medicine Schedule an appointment as soon as possible for a visit   [address]  [address]  [phone]            Patient's Medications   Discharge Prescriptions    No medications on file       No discharge procedures on file      PDMP Review     None          ED Provider  Electronically Signed by           Martin Trinidad DO  09/26/22 1949

## 2022-09-26 NOTE — Clinical Note
Lalo San was seen and treated in our emergency department on 9/26/2022  Diagnosis:     Catrina Mejia    He may return on this date:     Return per covid policy     If you have any questions or concerns, please don't hesitate to call        Lawrence Pandya, DO    ______________________________           _______________          _______________  Hospital Representative                              Date                                Time

## 2022-09-26 NOTE — Clinical Note
Huber Mckinley was seen and treated in our emergency department on 9/26/2022  Diagnosis:     Lendon Glow    He may return on this date:     Return per covid policy     If you have any questions or concerns, please don't hesitate to call        Fausto Castellanos, DO    ______________________________           _______________          _______________  Hospital Representative                              Date                                Time

## 2022-09-28 ENCOUNTER — APPOINTMENT (OUTPATIENT)
Dept: RADIOLOGY | Facility: HOSPITAL | Age: 22
End: 2022-09-28
Payer: COMMERCIAL

## 2022-09-28 ENCOUNTER — HOSPITAL ENCOUNTER (EMERGENCY)
Facility: HOSPITAL | Age: 22
Discharge: HOME | End: 2022-09-28
Attending: EMERGENCY MEDICINE
Payer: COMMERCIAL

## 2022-09-28 VITALS
OXYGEN SATURATION: 100 % | HEART RATE: 74 BPM | DIASTOLIC BLOOD PRESSURE: 67 MMHG | SYSTOLIC BLOOD PRESSURE: 148 MMHG | TEMPERATURE: 97.8 F | HEIGHT: 71 IN | WEIGHT: 170 LBS | BODY MASS INDEX: 23.8 KG/M2 | RESPIRATION RATE: 18 BRPM

## 2022-09-28 DIAGNOSIS — S41.111A ARM LACERATION, RIGHT, INITIAL ENCOUNTER: Primary | ICD-10-CM

## 2022-09-28 PROCEDURE — 90471 IMMUNIZATION ADMIN: CPT

## 2022-09-28 PROCEDURE — 73130 X-RAY EXAM OF HAND: CPT

## 2022-09-28 PROCEDURE — 73060 X-RAY EXAM OF HUMERUS: CPT

## 2022-09-28 PROCEDURE — 99284 EMERGENCY DEPT VISIT MOD MDM: CPT | Performed by: EMERGENCY MEDICINE

## 2022-09-28 PROCEDURE — 90715 TDAP VACCINE 7 YRS/> IM: CPT | Performed by: EMERGENCY MEDICINE

## 2022-09-28 PROCEDURE — 99283 EMERGENCY DEPT VISIT LOW MDM: CPT

## 2022-09-28 PROCEDURE — 12001 RPR S/N/AX/GEN/TRNK 2.5CM/<: CPT | Performed by: EMERGENCY MEDICINE

## 2022-09-28 RX ORDER — LIDOCAINE HYDROCHLORIDE 10 MG/ML
INJECTION, SOLUTION EPIDURAL; INFILTRATION; INTRACAUDAL; PERINEURAL
Status: COMPLETED
Start: 2022-09-28 | End: 2022-09-28

## 2022-09-28 RX ORDER — LIDOCAINE HYDROCHLORIDE 10 MG/ML
5 INJECTION, SOLUTION EPIDURAL; INFILTRATION; INTRACAUDAL; PERINEURAL ONCE
Status: COMPLETED | OUTPATIENT
Start: 2022-09-28 | End: 2022-09-28

## 2022-09-28 RX ORDER — GINSENG 100 MG
1 CAPSULE ORAL ONCE
Status: DISCONTINUED | OUTPATIENT
Start: 2022-09-28 | End: 2022-09-28 | Stop reason: HOSPADM

## 2022-09-28 RX ADMIN — LIDOCAINE HYDROCHLORIDE 5 ML: 10 INJECTION, SOLUTION EPIDURAL; INFILTRATION; INTRACAUDAL; PERINEURAL at 01:12

## 2022-09-28 RX ADMIN — LIDOCAINE HYDROCHLORIDE 5 ML: 10 INJECTION, SOLUTION EPIDURAL; INFILTRATION; INTRACAUDAL at 01:12

## 2022-09-28 RX ADMIN — TETANUS TOXOID, REDUCED DIPHTHERIA TOXOID AND ACELLULAR PERTUSSIS VACCINE, ADSORBED 0.5 ML: 5; 2.5; 8; 8; 2.5 SUSPENSION INTRAMUSCULAR at 00:59

## 2022-09-28 NOTE — ED PROVIDER NOTES
History  Chief Complaint   Patient presents with    Laceration     Pt reports falling through glass door; approx 1 cm lac to upper R outer arm, abrasion to R hand     28-year-old male with no significant past medical history presents the emergency department for evaluation of right arm laceration  Patient reports that he fell into a glass door causing the small laceration  Patient states the accident happened approximately 2 hours prior to arrival   1 cm laceration noted to the right triceps, no active bleeding  Denies any pain, numbness or tingling  Also has a small abrasion over the right thenar eminence  Denies any head strike or loss of consciousness  Denies any other complaints at this time  He believes his tetanus is up-to-date, but per chart review, last tetanus was in 2013  Prior to Admission Medications   Prescriptions Last Dose Informant Patient Reported? Taking? Vraylar 1 5 MG capsule   Yes No   Sig: Take 1 5 mg by mouth daily   atoMOXetine (STRATTERA) 40 mg capsule   No No   Sig: Take 1 capsule (40 mg total) by mouth daily   Patient not taking: No sig reported   nystatin (MYCOSTATIN) 500,000 units/5 mL suspension   No No   Sig: Apply 5 mL (500,000 Units total) to the mouth or throat 4 (four) times a day   predniSONE 10 mg tablet   No No   Sig: Take 6 pills day 1, then 5 pills day 2, 4 pills day 3, 3 pills day 4, 2 pills day 5, 1 pill day 6,      Facility-Administered Medications: None       Past Medical History:   Diagnosis Date    ADHD (attention deficit hyperactivity disorder)        History reviewed  No pertinent surgical history  Family History   Problem Relation Age of Onset    No Known Problems Mother     No Known Problems Father      I have reviewed and agree with the history as documented      E-Cigarette/Vaping    E-Cigarette Use Never User      E-Cigarette/Vaping Substances     Social History     Tobacco Use    Smoking status: Former Smoker     Types: E-Cigarettes Quit date: 2021     Years since quittin 4    Smokeless tobacco: Never Used   Vaping Use    Vaping Use: Never used   Substance Use Topics    Alcohol use: Yes     Comment: socially    Drug use: No       Review of Systems   Constitutional: Negative for chills and fever  Respiratory: Negative for shortness of breath  Gastrointestinal: Negative for nausea and vomiting  Musculoskeletal: Negative for neck pain and neck stiffness  Skin: Positive for wound  Neurological: Negative for weakness, numbness and headaches  All other systems reviewed and are negative  Physical Exam  Physical Exam  Vitals and nursing note reviewed  Constitutional:       General: He is not in acute distress  HENT:      Head: Normocephalic and atraumatic  Right Ear: External ear normal       Left Ear: External ear normal       Nose: Nose normal       Mouth/Throat:      Mouth: Mucous membranes are moist    Eyes:      Extraocular Movements: Extraocular movements intact  Conjunctiva/sclera: Conjunctivae normal       Pupils: Pupils are equal, round, and reactive to light  Cardiovascular:      Rate and Rhythm: Normal rate  Pulses: Normal pulses  Pulmonary:      Effort: Pulmonary effort is normal  No respiratory distress  Breath sounds: No stridor  Musculoskeletal:         General: No tenderness or deformity  Normal range of motion  Cervical back: Normal range of motion and neck supple  Skin:     General: Skin is warm and dry  Capillary Refill: Capillary refill takes less than 2 seconds  Comments: 1 cm superficial laceration to the right triceps, no active bleeding, no palpable foreign body, no surrounding erythema  Superficial abrasion over the right thenar eminence, no active bleeding  Right upper extremity is neurovascular intact  Patient has full range of motion  Neurological:      General: No focal deficit present        Mental Status: He is alert and oriented to person, place, and time  Psychiatric:         Mood and Affect: Mood normal          Behavior: Behavior normal          Vital Signs  ED Triage Vitals [09/28/22 0045]   Temperature Pulse Respirations Blood Pressure SpO2   97 8 °F (36 6 °C) 74 18 148/67 100 %      Temp Source Heart Rate Source Patient Position - Orthostatic VS BP Location FiO2 (%)   Oral Monitor Sitting Left arm --      Pain Score       No Pain           Vitals:    09/28/22 0045   BP: 148/67   Pulse: 74   Patient Position - Orthostatic VS: Sitting         Visual Acuity      ED Medications  Medications   bacitracin topical ointment 1 small application (1 small application Topical Not Given 9/28/22 0115)   tetanus-diphtheria-acellular pertussis (BOOSTRIX) IM injection 0 5 mL (0 5 mL Intramuscular Given 9/28/22 0059)   lidocaine (PF) (XYLOCAINE-MPF) 1 % injection 5 mL (5 mL Infiltration Given During Downtime 9/28/22 0112)       Diagnostic Studies  Results Reviewed     None                 XR hand 3+ views RIGHT   ED Interpretation by Lina Kay MD (09/28 0104)   No acute osseous abnormality, no obvious foreign body present      XR humerus RIGHT   ED Interpretation by Lina Kay MD (09/28 0104)   No foreign body present, no acute osseous abnormality                 Procedures  Laceration repair    Date/Time: 9/28/2022 12:45 AM  Performed by: Lina Kay MD  Authorized by: Lina Kay MD   Risks and benefits: risks, benefits and alternatives were discussed  Consent given by: patient  Patient understanding: patient states understanding of the procedure being performed  Patient identity confirmed: verbally with patient  Time out: Immediately prior to procedure a "time out" was called to verify the correct patient, procedure, equipment, support staff and site/side marked as required    Body area: upper extremity  Location details: right upper arm  Laceration length: 1 cm  Foreign bodies: no foreign bodies  Tendon involvement: none  Nerve involvement: none  Vascular damage: no  Anesthesia: local infiltration    Anesthesia:  Local Anesthetic: lidocaine 1% without epinephrine  Anesthetic total: 2 mL    Sedation:  Patient sedated: no      Wound Dehiscence:  Superficial Wound Dehiscence: simple closure      Procedure Details:  Irrigation solution: saline  Irrigation method: syringe  Amount of cleaning: standard  Debridement: minimal  Degree of undermining: none  Skin closure: 4-0 nylon  Number of sutures: 2  Technique: simple  Approximation: close  Approximation difficulty: simple  Dressing: Bandaid  Patient tolerance: patient tolerated the procedure well with no immediate complications               ED Course                               SBIRT 22yo+    Flowsheet Row Most Recent Value   SBIRT (25 yo +)    In order to provide better care to our patients, we are screening all of our patients for alcohol and drug use  Would it be okay to ask you these screening questions? Yes Filed at: 09/28/2022 0047   Initial Alcohol Screen: US AUDIT-C     1  How often do you have a drink containing alcohol? 0 Filed at: 09/28/2022 0047   2  How many drinks containing alcohol do you have on a typical day you are drinking? 0 Filed at: 09/28/2022 0047   3a  Male UNDER 65: How often do you have five or more drinks on one occasion? 0 Filed at: 09/28/2022 0047   3b  FEMALE Any Age, or MALE 65+: How often do you have 4 or more drinks on one occassion? 0 Filed at: 09/28/2022 0047   Audit-C Score 0 Filed at: 09/28/2022 7071   CATRACHITA: How many times in the past year have you    Used an illegal drug or used a prescription medication for non-medical reasons? Never Filed at: 09/28/2022 0047                    MDM  Number of Diagnoses or Management Options  Arm laceration, right, initial encounter  Diagnosis management comments: 81-year-old male presents the ED for evaluation of right upper extremity laceration  On exam he is well-appearing in no acute distress    No other signs of trauma at this time  Will get x-rays to evaluate for possible retained pieces of glass  Will update tetanus  Will likely repair the triceps laceration with Steri-Strip  X-rays were negative for any foreign body  Wound was repaired with 2 sutures  Patient tolerated the procedure well without complication  He will be discharged home  He was advised to have sutures removed in 7-10 days  He was given strict return precautions  Disposition  Final diagnoses:   Arm laceration, right, initial encounter     Time reflects when diagnosis was documented in both MDM as applicable and the Disposition within this note     Time User Action Codes Description Comment    9/28/2022 12:56 AM Check, Blease Arch Add [S41 111A] Arm laceration with complication, right, initial encounter     9/28/2022 12:56 AM Check, Marino Brochure [S41 111A] Arm laceration with complication, right, initial encounter     9/28/2022 12:56 AM Check, Blease Arch Add [S41 111A] Arm laceration, right, initial encounter       ED Disposition     ED Disposition   Discharge    Condition   Stable    Date/Time   Wed Sep 28, 2022  1:05 AM    Comment   Veda Ramirez discharge to home/self care                 Follow-up Information     Follow up With Specialties Details Why Contact Info Additional [address] Emergency Department Emergency Medicine  If symptoms worsen [address]  [place] [phone]  [facility] Emergency Department, [address] [place] [address], [address] [facility],  Family Medicine Schedule an appointment as soon as possible for a visit   [address]  [address]  [phone]             Discharge Medication List as of 9/28/2022  1:05 AM      CONTINUE these medications which have NOT CHANGED    Details   atoMOXetine (STRATTERA) 40 mg capsule Take 1 capsule (40 mg total) by mouth daily, Starting Thu 4/14/2022, Normal nystatin (MYCOSTATIN) 500,000 units/5 mL suspension Apply 5 mL (500,000 Units total) to the mouth or throat 4 (four) times a day, Starting Sun 8/14/2022, Normal      predniSONE 10 mg tablet Take 6 pills day 1, then 5 pills day 2, 4 pills day 3, 3 pills day 4, 2 pills day 5, 1 pill day 6,, Normal      Vraylar 1 5 MG capsule Take 1 5 mg by mouth daily, Starting Tue 8/2/2022, Historical Med             No discharge procedures on file      PDMP Review     None          ED Provider  Electronically Signed by           Maximiliano Carrion MD  09/28/22 9845

## 2022-09-28 NOTE — DISCHARGE INSTRUCTIONS
Recommend Tylenol and ibuprofen as needed for pain    Return to the emergency department if you experience worsening of symptoms including worsening pain, any numbness or tingling in the hand, any right upper extremity weakness, any surrounding erythema or if you develop fevers

## 2022-10-24 ENCOUNTER — HOSPITAL ENCOUNTER (EMERGENCY)
Facility: HOSPITAL | Age: 22
Discharge: HOME | End: 2022-10-24
Attending: EMERGENCY MEDICINE
Payer: COMMERCIAL

## 2022-10-24 ENCOUNTER — APPOINTMENT (EMERGENCY)
Dept: RADIOLOGY | Facility: HOSPITAL | Age: 22
End: 2022-10-24
Payer: COMMERCIAL

## 2022-10-24 VITALS
BODY MASS INDEX: 22.54 KG/M2 | RESPIRATION RATE: 18 BRPM | SYSTOLIC BLOOD PRESSURE: 137 MMHG | HEIGHT: 71 IN | TEMPERATURE: 98.8 F | HEART RATE: 66 BPM | WEIGHT: 161 LBS | OXYGEN SATURATION: 98 % | DIASTOLIC BLOOD PRESSURE: 77 MMHG

## 2022-10-24 DIAGNOSIS — M25.561 RIGHT KNEE PAIN: Primary | ICD-10-CM

## 2022-10-24 DIAGNOSIS — M54.9 BACK PAIN: ICD-10-CM

## 2022-10-24 PROCEDURE — 73564 X-RAY EXAM KNEE 4 OR MORE: CPT

## 2022-10-24 PROCEDURE — 99284 EMERGENCY DEPT VISIT MOD MDM: CPT | Performed by: EMERGENCY MEDICINE

## 2022-10-24 RX ORDER — KETOROLAC TROMETHAMINE 30 MG/ML
30 INJECTION, SOLUTION INTRAMUSCULAR; INTRAVENOUS ONCE
Status: COMPLETED | OUTPATIENT
Start: 2022-10-24 | End: 2022-10-24

## 2022-10-24 RX ADMIN — KETOROLAC TROMETHAMINE 30 MG: 30 INJECTION, SOLUTION INTRAMUSCULAR at 17:27
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
## 2022-10-25 ENCOUNTER — TELEPHONE (OUTPATIENT)
Dept: PHYSICAL THERAPY | Facility: OTHER | Age: 22
End: 2022-10-25

## 2022-10-25 NOTE — TELEPHONE ENCOUNTER
Call placed to the patient per [facility] referral     Unable to get a ring tone or leave a message the phone number is out of service       Will close referral and wait for patient to call using CSP info provided on AVS, by ED    Closed- Unable to contact patient

## 2022-12-01 ENCOUNTER — OFFICE VISIT (OUTPATIENT)
Dept: URGENT CARE | Age: 22
End: 2022-12-01

## 2022-12-01 VITALS
SYSTOLIC BLOOD PRESSURE: 143 MMHG | TEMPERATURE: 97.7 F | RESPIRATION RATE: 18 BRPM | HEART RATE: 84 BPM | OXYGEN SATURATION: 99 % | DIASTOLIC BLOOD PRESSURE: 65 MMHG

## 2022-12-01 DIAGNOSIS — R05.1 ACUTE COUGH: Primary | ICD-10-CM

## 2022-12-01 LAB
SARS-COV-2 AG UPPER RESP QL IA: NEGATIVE
VALID CONTROL: NORMAL

## 2022-12-01 NOTE — PATIENT INSTRUCTIONS
Take zyrtec or allegra daily  Use flonase 1-2 sprays in each nare daily   Use nasal saline to the nose,   Use humidifer in room  Symptoms worsen go to ER  Rest  Take mucinex as directed over the counter  Rapid covid test done today in the office,

## 2022-12-01 NOTE — PROGRESS NOTES
NAME: Cole Rivera is a 25 y o  male  : 2000    MRN: [id number]    /65   Pulse 84   Temp 97 7 °F (36 5 °C)   Resp 18   SpO2 99%     Assessment and Plan   Acute cough [R05 1]  1  Acute cough  Covid/Flu-Office Collect    Poct Covid 19 Rapid Antigen Test          Rob Carson was seen today for cough  Diagnoses and all orders for this visit:    Acute cough  -     Covid/Flu-Office Collect  -     Poct Covid 19 Rapid Antigen Test        Patient Instructions   Patient Instructions   Take zyrtec or allegra daily  Use flonase 1-2 sprays in each nare daily   Use nasal saline to the nose,   Use humidifer in room  Symptoms worsen go to ER  Rest  Take mucinex as directed over the counter  Rapid covid test done today in the office,       Proceed to the nearest ER if symptoms worsen, Follow up with your PCP  Continue to social distance, wash your hands, and wear your masks  Please continue to follow the CDC  gov guidelines daily for they are subject to change on COVID-19    Chief Complaint     Chief Complaint   Patient presents with   • Cough     Cough, chills and congestin started yesterday 22  History of Present Illness     26 yo male here today with cough, chills and congestion that started one day ago  He has not taken any home covid test  Friend at home is also sick  He is not covid vaccinated and is not flu vaccinated  He has not taken anything over the counter for his symptoms and has not taken a covid test either  He currently doesn't work at this time  Denies CP, SOB, n/v/d      Review of Systems   Review of Systems   Constitutional: Negative for fever  HENT: Positive for congestion, postnasal drip, rhinorrhea and sore throat  Negative for ear pain, sinus pressure and sinus pain  Eyes: Negative  Respiratory: Positive for cough  Negative for shortness of breath and wheezing  Cardiovascular: Negative  Negative for chest pain  Gastrointestinal: Negative  Genitourinary: Negative  Musculoskeletal: Negative  Skin: Negative  Negative for rash  Allergic/Immunologic: Negative  Neurological: Negative  Hematological: Negative  Psychiatric/Behavioral: Negative  All other systems reviewed and are negative  Current Medications       Current Outpatient Medications:   •  atoMOXetine (STRATTERA) 40 mg capsule, Take 1 capsule (40 mg total) by mouth daily (Patient not taking: No sig reported), Disp: 30 capsule, Rfl: 0  •  nystatin (MYCOSTATIN) 500,000 units/5 mL suspension, Apply 5 mL (500,000 Units total) to the mouth or throat 4 (four) times a day, Disp: 473 mL, Rfl: 0  •  predniSONE 10 mg tablet, Take 6 pills day 1, then 5 pills day 2, 4 pills day 3, 3 pills day 4, 2 pills day 5, 1 pill day 6,, Disp: 6 tablet, Rfl: 0  •  Vraylar 1 5 MG capsule, Take 1 5 mg by mouth daily, Disp: , Rfl:     Current Allergies     Allergies as of 12/01/2022   • (No Known Allergies)              Past Medical History:   Diagnosis Date   • ADHD (attention deficit hyperactivity disorder)        History reviewed  No pertinent surgical history  Family History   Problem Relation Age of Onset   • No Known Problems Mother    • No Known Problems Father          Medications have been verified  The following portions of the patient's history were reviewed and updated as appropriate: allergies, current medications, past family history, past medical history, past social history, past surgical history and problem list     Objective   /65   Pulse 84   Temp 97 7 °F (36 5 °C)   Resp 18   SpO2 99%      Physical Exam     Physical Exam  Constitutional:       General: He is awake  He is not in acute distress  Appearance: He is well-developed  HENT:      Head: Normocephalic  Jaw: Tenderness present        Right Ear: Hearing, ear canal and external ear normal       Left Ear: Hearing, ear canal and external ear normal       Ears:      Comments: TMs not visible due to wax in both ears, pt denies pain     Nose: Congestion present  Right Turbinates: Enlarged and swollen  Left Turbinates: Enlarged and swollen  Right Sinus: No maxillary sinus tenderness  Left Sinus: No maxillary sinus tenderness  Mouth/Throat:      Lips: Pink  Mouth: Mucous membranes are moist       Palate: No mass  Pharynx: Uvula midline  Posterior oropharyngeal erythema and uvula swelling present  No pharyngeal swelling or oropharyngeal exudate  Tonsils: No tonsillar exudate or tonsillar abscesses  Comments: Clear drainage in the back of throat    Cardiovascular:      Rate and Rhythm: Normal rate and regular rhythm  Heart sounds: Normal heart sounds  Pulmonary:      Effort: Pulmonary effort is normal       Breath sounds: Normal breath sounds and air entry  No decreased breath sounds, wheezing, rhonchi or rales  Musculoskeletal:      Cervical back: Normal range of motion  Skin:     General: Skin is warm  Capillary Refill: Capillary refill takes less than 2 seconds  Neurological:      General: No focal deficit present  Mental Status: He is alert and oriented to person, place, and time  Psychiatric:         Attention and Perception: Attention normal          Mood and Affect: Mood normal          Behavior: Behavior is cooperative  Note: Portions of this record may have been created with voice recognition software  Occasional wrong word or "sound a like" substitutions may have occurred due to the inherent limitations of voice recognition software  Please read the chart carefully and recognize, using context, where substitutions have occurred  YOEL Scott

## 2022-12-02 LAB
FLUAV RNA RESP QL NAA+PROBE: NEGATIVE
FLUBV RNA RESP QL NAA+PROBE: NEGATIVE
SARS-COV-2 RNA RESP QL NAA+PROBE: NEGATIVE

## 2022-12-28 ENCOUNTER — OFFICE VISIT (OUTPATIENT)
Dept: URGENT CARE | Age: 22
End: 2022-12-28

## 2022-12-28 ENCOUNTER — APPOINTMENT (OUTPATIENT)
Dept: RADIOLOGY | Age: 22
End: 2022-12-28

## 2022-12-28 VITALS
TEMPERATURE: 97.5 F | DIASTOLIC BLOOD PRESSURE: 68 MMHG | SYSTOLIC BLOOD PRESSURE: 104 MMHG | OXYGEN SATURATION: 99 % | HEART RATE: 94 BPM | RESPIRATION RATE: 18 BRPM

## 2022-12-28 DIAGNOSIS — S69.91XA INJURY OF RIGHT HAND, INITIAL ENCOUNTER: Primary | ICD-10-CM

## 2022-12-28 DIAGNOSIS — S69.91XA INJURY OF RIGHT HAND, INITIAL ENCOUNTER: ICD-10-CM
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
## 2023-03-24 ENCOUNTER — OFFICE VISIT (OUTPATIENT)
Dept: OBGYN CLINIC | Facility: CLINIC | Age: 23
End: 2023-03-24

## 2023-03-24 VITALS
DIASTOLIC BLOOD PRESSURE: 55 MMHG | HEIGHT: 71 IN | BODY MASS INDEX: 22.45 KG/M2 | HEART RATE: 71 BPM | SYSTOLIC BLOOD PRESSURE: 131 MMHG

## 2023-03-24 DIAGNOSIS — M25.561 RIGHT KNEE PAIN, UNSPECIFIED CHRONICITY: Primary | ICD-10-CM

## 2023-03-24 NOTE — PROGRESS NOTES
Assessment:   Diagnosis ICD-10-CM Associated Orders   1  Right knee pain, unspecified chronicity  M25 561 XR knee 3 vw right non injury          Plan:  • Patient has right knee pain  • X-rays reviewed of the right knee which showed no acute fractures dislocations no acute osseous abnormalities  • Provided patient with knee exercises to do at home  • Advised patient to take over-the-counter ibuprofen 600 mg 3 times a day for pain relief      To do next visit:  Return in about 2 months (around 5/24/2023)  The above stated was discussed in layman's terms and the patient expressed understanding  All questions were answered to the patient's satisfaction  The patient has diffuse tenderness along his right knee  It is not isolated to any anatomic region  He was instructed to take a therapeutic dose of ibuprofen, given a home exercise sheet and we will see him back in 2 months  If there is any further changes, he would not hesitate to let us know      Scribe Attestation    I,:  Jamel Wolff am acting as a scribe while in the presence of the attending physician :       I,:  Carly Avery, DO personally performed the services described in this documentation    as scribed in my presence :             Subjective:   Suleiman Linn is a 26 y o  male who presents today for evaluation for right knee pain  He was referred by his PCP Dr Martinez Norris  States he has been having right knee pain for the last few weeks  Denies any falls injuries  He works as a  does a lot of heavy lifting, kneeling and squatting  Having pain over the anterior lateral aspect of the right knee  He has been taking ibuprofen 400 mg twice a day for pain relief  Review of systems negative unless otherwise specified in HPI  Review of Systems   Constitutional: Negative for chills and fever  HENT: Negative for ear pain and sore throat  Eyes: Negative for pain and visual disturbance     Respiratory: Negative for cough and shortness of breath  Cardiovascular: Negative for chest pain and palpitations  Gastrointestinal: Negative for abdominal pain and vomiting  Genitourinary: Negative for dysuria and hematuria  Musculoskeletal: Negative  Skin: Negative for color change and rash  Neurological: Negative for seizures and syncope  All other systems reviewed and are negative  Past Medical History:   Diagnosis Date   • ADHD (attention deficit hyperactivity disorder)        History reviewed  No pertinent surgical history      Family History   Problem Relation Age of Onset   • No Known Problems Mother    • No Known Problems Father        Social History     Occupational History   • Not on file   Tobacco Use   • Smoking status: Former     Types: E-Cigarettes     Quit date: 2021     Years since quittin 9   • Smokeless tobacco: Never   Vaping Use   • Vaping Use: Never used   Substance and Sexual Activity   • Alcohol use: Yes     Comment: socially   • Drug use: No   • Sexual activity: Not Currently         Current Outpatient Medications:   •  atoMOXetine (STRATTERA) 40 mg capsule, Take 1 capsule (40 mg total) by mouth daily (Patient not taking: Reported on 2022), Disp: 30 capsule, Rfl: 0  •  nystatin (MYCOSTATIN) 500,000 units/5 mL suspension, Apply 5 mL (500,000 Units total) to the mouth or throat 4 (four) times a day (Patient not taking: Reported on 2022), Disp: 473 mL, Rfl: 0  •  predniSONE 10 mg tablet, Take 6 pills day 1, then 5 pills day 2, 4 pills day 3, 3 pills day 4, 2 pills day 5, 1 pill day 6, (Patient not taking: Reported on 2022), Disp: 6 tablet, Rfl: 0  •  Vraylar 1 5 MG capsule, Take 1 5 mg by mouth daily (Patient not taking: Reported on 2022), Disp: , Rfl:     No Known Allergies         Vitals:    23 0955   BP: 131/55   Pulse: 71       Objective:                    Right Knee Exam     Tenderness   The patient is experiencing tenderness in the lateral joint line     Range of Motion   The patient has normal right knee ROM  Tests   Varus: negative Valgus: negative  Lachman:  Anterior - negative    Posterior - negative  Drawer:  Anterior - negative    Posterior - negative    Other   Erythema: absent  Scars: absent  Swelling: none  Effusion: no effusion present    Comments:  + patella grind test             Diagnostics, reviewed and taken today if performed as documented: The attending physician has personally reviewed the pertinent films in PACS and interpretation is as follows: X-ray right knee demonstrates no acute osseous abnormalities, no acute fractures or dislocations      Procedures, if performed today:    Procedures    None performed      Portions of the record may have been created with voice recognition software  Occasional wrong word or "sound a like" substitutions may have occurred due to the inherent limitations of voice recognition software  Read the chart carefully and recognize, using context, where substitutions have occurred

## 2023-03-24 NOTE — PATIENT INSTRUCTIONS
Knee Exercises   AMBULATORY CARE:   What you need to know about knee exercises:  Knee exercises help strengthen the muscles around your knee  Strong muscles can help reduce pain and decrease your risk of future injury  Knee exercises also help you heal after an injury or surgery  These are beginning exercises  Ask your healthcare provider if you need to see a physical therapist for more advanced exercises  General guidelines for knee exercises:   Start slowly  As you get stronger, you may be able to do more sets of each exercise or add weights  Stop if you feel pain  It is normal to feel some discomfort at first, but you should not feel pain  Tell your provider or physical therapist if you have pain while you exercise  Regular exercise will help decrease your discomfort over time  Do the exercises on both legs  Do this so both knees remain strong  Warm up before you do knee exercises  Walk or ride a stationary bike for 5 or 10 minutes to warm your muscles  How to perform knee stretches safely:  Always stretch before you do strengthening exercises  Do these stretching exercises again after you do the strengthening exercises  Do these stretches 4 or 5 days a week, or as directed  Standing calf stretch: Face a wall and place both palms flat on the wall, or hold the back of a chair for balance  Keep a slight bend in your knees  Take a big step backward with one leg  Keep your other leg directly under you  Keep both heels flat and press your hips forward  Hold the stretch for 30 seconds, and then relax for 30 seconds  Switch legs  Repeat 2 or 3 times on each leg  Standing quadriceps stretch:  Stand and place one hand against a wall or hold the back of a chair for balance  With your weight on one leg, bend your other leg and grab your ankle  Bring your heel toward your buttocks  Hold the stretch for 30 to 60 seconds  Switch legs  Repeat 2 or 3 times on each leg           Sitting hamstring stretch:  Sit with both legs straight in front of you  Do not point or flex your toes  Place your palms on the floor and slide your hands forward until you feel the stretch  Do not round your back  Hold the stretch for 30 seconds  Repeat 2 or 3 times  How to perform knee strengthening exercises safely:  Do these exercises 4 or 5 days a week, or as directed  Standing half squats:  Stand with your feet shoulder-width apart  Lean your back against a wall or hold the back of a chair for balance, if needed  Slowly sit down about 10 inches, as if you are going to sit in a chair  Your body weight should be mostly over your heels  Hold the squat for 5 seconds, then rise to a standing position  Do 3 sets of 10 squats to strengthen your buttocks and thighs  Standing hamstring curls: Face a wall and place both palms flat on the wall, or hold the back of a chair for balance  With your weight on one leg, lift your other foot as close to your buttocks as you can  Hold for 5 seconds and then lower your leg  Do 2 sets of 10 curls on each leg  This exercise strengthens the muscles in the back of your thigh  Standing calf raises:  Face a wall and place both palms flat on the wall, or hold the back of a chair for balance  Stand up straight, and do not lean  Place all your weight on one leg by lifting the other foot off the floor  Raise the heel of the foot that is on the floor as high as you can and then lower it  Do 2 sets of 10 calf raises on each leg to strengthen your calf muscles  Straight leg lifts:  Lie on your stomach with straight legs  Fold your arms in front of you and rest your head in your arms  Tighten your leg muscles and raise one leg as high as you can  Hold for 5 seconds, then lower your leg  Do 2 sets of 10 lifts on each leg to strengthen your buttocks  Sitting leg lifts:  Sit in a chair  Slowly straighten and raise one leg  Squeeze your thigh muscles and hold for 5 seconds  Relax and return your foot to the floor  Do 2 sets of 10 lifts on each leg  This helps strengthen the muscles in the front of your thigh  Call your doctor or physical therapist if:   You have new pain or your pain becomes worse  You have questions or concerns about your condition or care  © Copyright [facility] [employer] 2022 Information is for End User's use only and may not be sold, redistributed or otherwise used for commercial purposes  The above information is an  only  It is not intended as medical advice for individual conditions or treatments  Talk to your doctor, nurse or pharmacist before following any medical regimen to see if it is safe and effective for you

## 2023-05-21 ENCOUNTER — OFFICE VISIT (OUTPATIENT)
Dept: URGENT CARE | Facility: CLINIC | Age: 23
End: 2023-05-21

## 2023-05-21 VITALS
OXYGEN SATURATION: 98 % | TEMPERATURE: 98.6 F | DIASTOLIC BLOOD PRESSURE: 63 MMHG | SYSTOLIC BLOOD PRESSURE: 147 MMHG | HEART RATE: 86 BPM | HEIGHT: 71 IN | BODY MASS INDEX: 25.11 KG/M2 | RESPIRATION RATE: 16 BRPM | WEIGHT: 179.4 LBS

## 2023-05-21 DIAGNOSIS — L23.7 POISON IVY DERMATITIS: ICD-10-CM

## 2023-05-21 DIAGNOSIS — L24.7 IRRITANT CONTACT DERMATITIS DUE TO PLANTS, EXCEPT FOOD: Primary | ICD-10-CM

## 2023-05-21 RX ORDER — LEVOCETIRIZINE DIHYDROCHLORIDE 5 MG/1
5 TABLET, FILM COATED ORAL EVERY EVENING
Qty: 30 TABLET | Refills: 0 | Status: SHIPPED | OUTPATIENT
Start: 2023-05-21 | End: 2023-06-20

## 2023-05-21 RX ORDER — PREDNISONE 10 MG/1
TABLET ORAL
Qty: 24 TABLET | Refills: 0 | Status: SHIPPED | OUTPATIENT
Start: 2023-05-21

## 2023-05-21 RX ORDER — DIAPER,BRIEF,INFANT-TODD,DISP
EACH MISCELLANEOUS 4 TIMES DAILY PRN
Qty: 30 G | Refills: 0 | Status: SHIPPED | OUTPATIENT
Start: 2023-05-21

## 2023-05-21 NOTE — PROGRESS NOTES
[facility]        NAME: Dasia Hurd is a 26 y o  male  : 2000    MRN: [id number]  DATE: May 21, 2023  TIME: 12:28 PM    Assessment and Plan   Irritant contact dermatitis due to plants, except food [L24 7]  1  Irritant contact dermatitis due to plants, except food  predniSONE 10 mg tablet    hydrocortisone 1 % cream    levocetirizine (XYZAL) 5 MG tablet      2  Poison ivy dermatitis  predniSONE 10 mg tablet    hydrocortisone 1 % cream    levocetirizine (XYZAL) 5 MG tablet            Patient Instructions       Follow up with PCP in 3-5 days  Proceed to  ER if symptoms worsen  You have been prescribed prednisone oral tabs, xyzal antihistamine tabs and hydrocortisone cream  You are to wash with prasad dish liquid  You are to follow up with your PCP in 3-5 days  Go to the ED if symptoms worsen          Chief Complaint     Chief Complaint   Patient presents with   • [facility] arms and left side noticed it 2 days ago         History of Present Illness       This is a 26year old male who states was clearing and cutting bushes and noted a rash on his arms 2 days ago  He states has been using OTC w/o relief  Denies fevers, chills,n/v/d  Review of Systems   Review of Systems   Constitutional: Negative  HENT: Negative  Eyes: Negative  Respiratory: Negative  Cardiovascular: Negative  Gastrointestinal: Negative  Endocrine: Negative  Genitourinary: Negative  Musculoskeletal: Negative  Skin: Positive for rash  Allergic/Immunologic: Negative  Neurological: Negative  Hematological: Negative  Psychiatric/Behavioral: Negative  All other systems reviewed and are negative          Current Medications       Current Outpatient Medications:   •  hydrocortisone 1 % cream, Apply topically 4 (four) times a day as needed for irritation, Disp: 30 g, Rfl: 0  •  levocetirizine (XYZAL) 5 MG tablet, Take 1 tablet (5 mg total) by mouth every evening, Disp: "30 tablet, Rfl: 0  •  predniSONE 10 mg tablet, Take 5 tabs po x 2 days; 4 tabs po x 2 days; 3 tabs po x 1 day; 2 tabs po x 1 day  1 tab po x 1 day , Disp: 24 tablet, Rfl: 0  •  Vraylar 1 5 MG capsule, Take 1 5 mg by mouth daily, Disp: , Rfl:   •  atoMOXetine (STRATTERA) 40 mg capsule, Take 1 capsule (40 mg total) by mouth daily (Patient not taking: Reported on 4/26/2022), Disp: 30 capsule, Rfl: 0  •  nystatin (MYCOSTATIN) 500,000 units/5 mL suspension, Apply 5 mL (500,000 Units total) to the mouth or throat 4 (four) times a day (Patient not taking: Reported on 12/28/2022), Disp: 473 mL, Rfl: 0  •  predniSONE 10 mg tablet, Take 6 pills day 1, then 5 pills day 2, 4 pills day 3, 3 pills day 4, 2 pills day 5, 1 pill day 6, (Patient not taking: Reported on 12/28/2022), Disp: 6 tablet, Rfl: 0    Current Allergies     Allergies as of 05/21/2023   • (No Known Allergies)            The following portions of the patient's history were reviewed and updated as appropriate: allergies, current medications, past family history, past medical history, past social history, past surgical history and problem list      Past Medical History:   Diagnosis Date   • ADHD (attention deficit hyperactivity disorder)        History reviewed  No pertinent surgical history  Family History   Problem Relation Age of Onset   • No Known Problems Mother    • No Known Problems Father          Medications have been verified  Objective   /63   Pulse 86   Temp 98 6 °F (37 °C)   Resp 16   Ht 5' 11\" (1 803 m)   Wt 81 4 kg (179 lb 6 4 oz)   SpO2 98%   BMI 25 02 kg/m²   No LMP for male patient  Physical Exam     Physical Exam  Vitals and nursing note reviewed  Constitutional:       General: He is not in acute distress  Appearance: Normal appearance  He is normal weight  He is not ill-appearing, toxic-appearing or diaphoretic  HENT:      Head: Normocephalic and atraumatic        Nose: Nose normal       Mouth/Throat:      " Mouth: Mucous membranes are moist    Eyes:      Extraocular Movements: Extraocular movements intact  Cardiovascular:      Rate and Rhythm: Normal rate  Pulmonary:      Effort: Pulmonary effort is normal    Musculoskeletal:         General: Normal range of motion  Cervical back: Normal range of motion  Skin:     General: Skin is warm  Capillary Refill: Capillary refill takes less than 2 seconds  Findings: Rash present  No erythema  Neurological:      General: No focal deficit present  Mental Status: He is alert and oriented to person, place, and time  Psychiatric:         Mood and Affect: Mood normal          Behavior: Behavior normal          Thought Content:  Thought content normal          Judgment: Judgment normal

## 2023-05-21 NOTE — PATIENT INSTRUCTIONS
You have been prescribed prednisone oral tabs, xyzal antihistamine tabs and hydrocortisone cream  You are to wash with prasad dish liquid    You are to follow up with your PCP in 3-5 days  Go to the ED if symptoms worsen

## 2024-06-29 ENCOUNTER — HOSPITAL ENCOUNTER (EMERGENCY)
Facility: HOSPITAL | Age: 24
Discharge: HOME | End: 2024-06-29
Attending: EMERGENCY MEDICINE | Admitting: EMERGENCY MEDICINE
Payer: COMMERCIAL

## 2024-06-29 ENCOUNTER — APPOINTMENT (OUTPATIENT)
Dept: RADIOLOGY | Facility: HOSPITAL | Age: 24
End: 2024-06-29
Payer: COMMERCIAL

## 2024-06-29 VITALS
BODY MASS INDEX: 26.5 KG/M2 | DIASTOLIC BLOOD PRESSURE: 71 MMHG | TEMPERATURE: 98.8 F | SYSTOLIC BLOOD PRESSURE: 144 MMHG | WEIGHT: 190 LBS | RESPIRATION RATE: 18 BRPM | HEART RATE: 80 BPM | OXYGEN SATURATION: 96 %

## 2024-06-29 DIAGNOSIS — M25.541 PAIN IN THUMB JOINT WITH MOVEMENT OF RIGHT HAND: Primary | ICD-10-CM

## 2024-06-29 DIAGNOSIS — M77.8 TENDINITIS OF RIGHT WRIST: ICD-10-CM

## 2024-06-29 PROCEDURE — 73110 X-RAY EXAM OF WRIST: CPT

## 2024-06-29 PROCEDURE — 99283 EMERGENCY DEPT VISIT LOW MDM: CPT

## 2024-06-29 PROCEDURE — 99284 EMERGENCY DEPT VISIT MOD MDM: CPT | Performed by: EMERGENCY MEDICINE

## 2024-06-29 RX ADMIN — IBUPROFEN 600 MG: 200 TABLET, FILM COATED ORAL at 19:24

## 2024-06-29 NOTE — ED PROVIDER NOTES
History  Chief Complaint   Patient presents with    Wrist Pain     Right wrist pain for about 1 month. Denies trauma. States intermittent tingling sensation.      HPI      This is a very pleasant, nontoxic-appearing, 24-year-old male presents the emergency department with a chief complaint of right wrist pain, right thumb pain.  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  Patient does not recall a specific injury but thinks this may be a overuse injury.  Patient states when he uses a large hammer or a hand-held hammer within 5 minutes he starts to have severe pain to the right thumb area.  Patient denies any numbness and tingling in the distal upper or lower extremity, no fever, no chills, no rash.  Patient has not been taking any medications for the above chief complaint.  Patient denies any numbness and tingling in the ulnar, medial or radial nerve distribution    Remaining 12 point review of systems is unremarkable.  Prior to Admission Medications   Prescriptions Last Dose Informant Patient Reported? Taking?   Vraylar 1.5 MG capsule   Yes No   Sig: Take 1.5 mg by mouth daily   atoMOXetine (STRATTERA) 40 mg capsule   No No   Sig: Take 1 capsule (40 mg total) by mouth daily   Patient not taking: Reported on 4/26/2022   hydrocortisone 1 % cream   No No   Sig: Apply topically 4 (four) times a day as needed for irritation   levocetirizine (XYZAL) 5 MG tablet   No No   Sig: Take 1 tablet (5 mg total) by mouth every evening   nystatin (MYCOSTATIN) 500,000 units/5 mL suspension   No No   Sig: Apply 5 mL (500,000 Units total) to the mouth or throat 4 (four) times a day   Patient not taking: Reported on 12/28/2022   predniSONE 10 mg tablet   No No   Sig: Take 6 pills day 1, then 5 pills day 2, 4 pills day 3, 3 pills day 4, 2 pills day 5, 1 pill day 6,   Patient not taking: Reported on 12/28/2022   predniSONE 10 mg tablet   No No   Sig: Take 5 tabs po x 2 days; 4 tabs po  x 2 days; 3 tabs po x 1 day; 2 tabs po x 1 day. 1 tab po x 1 day.      Facility-Administered Medications: None       Past Medical History:   Diagnosis Date    ADHD (attention deficit hyperactivity disorder)        History reviewed. No pertinent surgical history.    Family History   Problem Relation Age of Onset    No Known Problems Mother     No Known Problems Father      I have reviewed and agree with the history as documented.    E-Cigarette/Vaping    E-Cigarette Use Never User      E-Cigarette/Vaping Substances     Social History     Tobacco Use    Smoking status: Former     Types: E-Cigarettes     Quit date: 4/1/2021     Years since quitting: 3.2    Smokeless tobacco: Never   Vaping Use    Vaping status: Never Used   Substance Use Topics    Alcohol use: Not Currently     Comment: socially    Drug use: No       Review of Systems   Constitutional: Negative.  Negative for chills, diaphoresis, fatigue and fever.   HENT: Negative.  Negative for dental problem, drooling and ear pain.    Eyes: Negative.    Respiratory: Negative.  Negative for chest tightness, shortness of breath and wheezing.    Cardiovascular: Negative.  Negative for chest pain, palpitations and leg swelling.   Gastrointestinal: Negative.  Negative for abdominal pain and diarrhea.   Endocrine: Negative.    Genitourinary: Negative.    Musculoskeletal: Negative.  Negative for back pain, gait problem, joint swelling, myalgias and neck pain.        R thumb pain   Skin: Negative.  Negative for wound.   Allergic/Immunologic: Negative.    Neurological: Negative.    Hematological: Negative.    Psychiatric/Behavioral: Negative.         Physical Exam  Physical Exam  Vitals and nursing note reviewed.   Constitutional:       General: He is not in acute distress.     Appearance: Normal appearance. He is normal weight. He is not ill-appearing, toxic-appearing or diaphoretic.   HENT:      Head: Normocephalic and atraumatic.      Right Ear: External ear normal.       Left Ear: External ear normal.      Nose: Nose normal.      Mouth/Throat:      Mouth: Mucous membranes are moist.      Pharynx: Oropharynx is clear.   Eyes:      Extraocular Movements: Extraocular movements intact.      Conjunctiva/sclera: Conjunctivae normal.      Pupils: Pupils are equal, round, and reactive to light.   Cardiovascular:      Rate and Rhythm: Normal rate.      Pulses: Normal pulses.   Pulmonary:      Effort: Pulmonary effort is normal.   Abdominal:      General: Abdomen is flat. Bowel sounds are normal.   Musculoskeletal:         General: Tenderness present. No deformity or signs of injury.        Hands:       Cervical back: Normal range of motion.      Right lower leg: No edema.      Left lower leg: No edema.      Comments: Point tenderness over the lateral aspect of the thenar eminence of the right thumb.  Breakdown of the skin, no induration erythema crepitus or pain out of proportion.  Capillary refill noted.          Skin:     General: Skin is warm.      Capillary Refill: Capillary refill takes less than 2 seconds.   Neurological:      General: No focal deficit present.      Mental Status: He is alert.   Psychiatric:         Mood and Affect: Mood normal.         Vital Signs  ED Triage Vitals   Temperature Pulse Respirations Blood Pressure SpO2   06/29/24 1803 06/29/24 1804 06/29/24 1804 06/29/24 1804 06/29/24 1804   98.8 °F (37.1 °C) 80 18 144/71 96 %      Temp Source Heart Rate Source Patient Position - Orthostatic VS BP Location FiO2 (%)   06/29/24 1803 06/29/24 1803 -- -- --   Temporal Monitor         Pain Score       06/29/24 1803       3           Vitals:    06/29/24 1804   BP: 144/71   Pulse: 80         Visual Acuity      ED Medications  Medications   ibuprofen (MOTRIN) tablet 600 mg (600 mg Oral Given 6/29/24 1924)       Diagnostic Studies  Results Reviewed       None                   XR wrist 3+ views RIGHT   ED Interpretation by Keyur Queen III, DO (06/29 1912)   Review  x-ray of the right wrist shows no acute fractures or dislocations.                 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    Brief focused differential dx in this patient is as follows: Thenar eminence muscle strain with and without over the first metacarpal joint/  No rash, no pain out of proportion, no crepitus.                               SBIRT 22yo+      Flowsheet Row Most Recent Value   Initial Alcohol Screen: US AUDIT-C     1. How often do you have a drink containing alcohol? 0 Filed at: 06/29/2024 1804   2. How many drinks containing alcohol do you have on a typical day you are drinking?  0 Filed at: 06/29/2024 1804   3a. Male UNDER 65: How often do you have five or more drinks on one occasion? 0 Filed at: 06/29/2024 1804   3b. FEMALE Any Age, or MALE 65+: How often do you have 4 or more drinks on one occassion? 0 Filed at: 06/29/2024 1804   Audit-C Score 0 Filed at: 06/29/2024 1804   CATRACHITA: How many times in the past year have you...    Used an illegal drug or used a prescription medication for non-medical reasons? Never Filed at: 06/29/2024 1804                      Medical Decision Making  Please see ED course for specifics, patient has tenderness over the right thenar eminence, x-rays unremarkable, patient needs to follow-up with orthopedics, but will not be of any assistance because the patient is unable to hold the equipment during his gainful appointment.  Exam not consistent with any nerve distribution or carpal tunnel syndrome.  Advised Tylenol /treat for pain and icing the patient's extremity after work.  Patient's presentation is consistent with an overuse  "injury.    Portions of the record may have been created with voice recognition software. Occasional wrong word or \"sound a like\" substitutions may have occurred due to the inherent limitations of voice recognition software. Read the chart carefully and recognize, using context, where substitutions have occurred.       Counseling: I had a detailed discussion with the patient and/or guardian regarding: the historical points, exam findings, and any diagnostic results supporting the discharge diagnosis, lab results, radiology results, discharge instructions reviewed with patient and/or family/caregiver and understanding was verbalized. Instructions given to return to the emergency department if symptoms worsen or persist, or if there are any questions or concerns that arise at home.        Amount and/or Complexity of Data Reviewed  Radiology: ordered and independent interpretation performed.             Disposition  Final diagnoses:   Pain in thumb joint with movement of right hand   Tendinitis of right wrist     Time reflects when diagnosis was documented in both MDM as applicable and the Disposition within this note       Time User Action Codes Description Comment    6/29/2024  7:22 PM Keyur Queen Add [M25.541] Pain in thumb joint with movement of right hand     6/29/2024  7:22 PM Keyur Queen Add [M77.8] Tendinitis of right wrist           ED Disposition       ED Disposition   Discharge    Condition   Stable    Date/Time   Sat Jun 29, 2024 1912    Comment   Darrell Toth discharge to home/self care.                   Follow-up Information       Follow up With Specialties Details Why Contact Info    Geeta Patel DO Family Medicine   [address]  [address]  [place] [id number]  [phone]      Darrell Chatman MD Orthopedic Surgery   [address] [place] [address]   [address]  [facility] [id number] [phone]              Discharge Medication List as of 6/29/2024  7:23 PM        CONTINUE these medications which have " NOT CHANGED    Details   atoMOXetine (STRATTERA) 40 mg capsule Take 1 capsule (40 mg total) by mouth daily, Starting Thu 4/14/2022, Normal      hydrocortisone 1 % cream Apply topically 4 (four) times a day as needed for irritation, Starting Sun 5/21/2023, Normal      levocetirizine (XYZAL) 5 MG tablet Take 1 tablet (5 mg total) by mouth every evening, Starting Sun 5/21/2023, Until Tue 6/20/2023, Normal      nystatin (MYCOSTATIN) 500,000 units/5 mL suspension Apply 5 mL (500,000 Units total) to the mouth or throat 4 (four) times a day, Starting Sun 8/14/2022, Normal      !! predniSONE 10 mg tablet Take 6 pills day 1, then 5 pills day 2, 4 pills day 3, 3 pills day 4, 2 pills day 5, 1 pill day 6,, Normal      !! predniSONE 10 mg tablet Take 5 tabs po x 2 days; 4 tabs po x 2 days; 3 tabs po x 1 day; 2 tabs po x 1 day. 1 tab po x 1 day., Normal      Vraylar 1.5 MG capsule Take 1.5 mg by mouth daily, Starting Tue 8/2/2022, Historical Med       !! - Potential duplicate medications found. Please discuss with provider.              PDMP Review       None            ED Provider  Electronically Signed by             Keyur Queen III,   06/29/24 2014

## 2024-07-01 ENCOUNTER — OFFICE VISIT (OUTPATIENT)
Dept: OBGYN CLINIC | Facility: CLINIC | Age: 24
End: 2024-07-01
Payer: COMMERCIAL

## 2024-07-01 VITALS
DIASTOLIC BLOOD PRESSURE: 79 MMHG | BODY MASS INDEX: 26.6 KG/M2 | HEART RATE: 86 BPM | HEIGHT: 71 IN | WEIGHT: 190 LBS | SYSTOLIC BLOOD PRESSURE: 130 MMHG

## 2024-07-01 DIAGNOSIS — M25.541 PAIN IN THUMB JOINT WITH MOVEMENT OF RIGHT HAND: ICD-10-CM

## 2024-07-01 DIAGNOSIS — G56.01 CARPAL TUNNEL SYNDROME OF RIGHT WRIST: Primary | ICD-10-CM

## 2024-07-01 PROCEDURE — 99214 OFFICE O/P EST MOD 30 MIN: CPT | Performed by: STUDENT IN AN ORGANIZED HEALTH CARE EDUCATION/TRAINING PROGRAM

## 2024-07-01 NOTE — PROGRESS NOTES
Ortho Sports Medicine New Patient Elbow Visit     Assesment:   24 y.o.male with right thumb pain and weakness for 1 month without injury and exam concerning for possible carpal tunnel syndrome    Plan:  I reviewed the history, exam, imaging with the patient in clinic today.  I did review the patient's x-rays which are negative for fracture, dislocation, or significant degenerative disease.  On exam, the patient has no significant findings today.  However he does endorse intermittent weakness and numbness in the radial side of the wrist and thumb.  He did have a negative Tinel's and Phalen's on exam today.  However, given the distribution of his symptoms, I discussed the possibility of carpal tunnel syndrome.  Recommended getting an ultrasound to evaluate the median nerve.  Patient works in [employer] and we discussed he could follow-up with one of the hand surgeons there since it is more convenient for him.  I discussed the patient follow-up on an as-needed basis. The patient demonstrated understanding of the discussion and was in agreement with the plan.  All of the questions were answered.  Patient can reach out to clinic with any questions or concerns at any time.    Conservative treatment:  Ultrasound ordered to evaluate for carpal tunnel syndrome.   Referral placed for hand surgery.     Imaging:  All imaging from today was reviewed by myself and explained to the patient.     Injection:  No Injection planned at this time.    Surgery:  No surgery is recommended at this point, continue with conservative treatment plan as noted.    Follow up:  No follow-ups on file.        Chief Complaint   Patient presents with    Right Wrist - Pain, Numbness       History of Present Illness:  The patient is a 24 y.o. RHD male seen in clinic for right wrist and thumb pain. The patient states that approximately one month ago, he developed weakness and numbness in his wrist and thumb. The patient cannot recall any specific mechanism  of injury. At baseline, he denies pain. He states that his symptoms occur with repetitive motion and activities such as swinging a hammer. He reports weakness with gripping activities. He states that the numbness occurs in the volar aspect of his wrist and travels to his hand.     Occupation: [employer] / [place]    The patient has the following co-morbidities: no pertinent medical history      Hand/wrist Surgical History:  None    Past Medical, Social and Family History:  Past Medical History:   Diagnosis Date    ADHD (attention deficit hyperactivity disorder)      History reviewed. No pertinent surgical history.  No Known Allergies  Current Outpatient Medications on File Prior to Visit   Medication Sig Dispense Refill    atoMOXetine (STRATTERA) 40 mg capsule Take 1 capsule (40 mg total) by mouth daily (Patient not taking: Reported on 4/26/2022) 30 capsule 0    hydrocortisone 1 % cream Apply topically 4 (four) times a day as needed for irritation (Patient not taking: Reported on 7/1/2024) 30 g 0    levocetirizine (XYZAL) 5 MG tablet Take 1 tablet (5 mg total) by mouth every evening 30 tablet 0    nystatin (MYCOSTATIN) 500,000 units/5 mL suspension Apply 5 mL (500,000 Units total) to the mouth or throat 4 (four) times a day (Patient not taking: Reported on 12/28/2022) 473 mL 0    predniSONE 10 mg tablet Take 6 pills day 1, then 5 pills day 2, 4 pills day 3, 3 pills day 4, 2 pills day 5, 1 pill day 6, (Patient not taking: Reported on 12/28/2022) 6 tablet 0    predniSONE 10 mg tablet Take 5 tabs po x 2 days; 4 tabs po x 2 days; 3 tabs po x 1 day; 2 tabs po x 1 day. 1 tab po x 1 day. (Patient not taking: Reported on 7/1/2024) 24 tablet 0    Vraylar 1.5 MG capsule Take 1.5 mg by mouth daily (Patient not taking: Reported on 7/1/2024)       No current facility-administered medications on file prior to visit.     Social History     Socioeconomic History    Marital status: /Civil Union     Spouse name: Not on  "file    Number of children: Not on file    Years of education: Not on file    Highest education level: Not on file   Occupational History    Not on file   Tobacco Use    Smoking status: Former     Types: E-Cigarettes     Quit date: 4/1/2021     Years since quitting: 3.2    Smokeless tobacco: Never   Vaping Use    Vaping status: Never Used   Substance and Sexual Activity    Alcohol use: Not Currently     Comment: socially    Drug use: No    Sexual activity: Not Currently   Other Topics Concern    Not on file   Social History Narrative    Has not traveled outside the [place]    No infectious disease exposure    No report of abuse     Social Determinants of Health     Financial Resource Strain: Not on file   Food Insecurity: Not on file   Transportation Needs: Not on file   Physical Activity: Not on file   Stress: Not on file   Social Connections: Not on file   Intimate Partner Violence: Not on file   Housing Stability: Not on file         I have reviewed the past medical, surgical, social and family history, medications and allergies as documented in the EMR.    Review of systems: ROS is negative other than that noted in the HPI.  Constitutional: Negative for fatigue and fever.   HENT: Negative for sore throat.    Respiratory: Negative for shortness of breath.    Cardiovascular: Negative for chest pain.   Gastrointestinal: Negative for abdominal pain.   Endocrine: Negative for cold intolerance and heat intolerance.   Genitourinary: Negative for flank pain.   Musculoskeletal: Negative for back pain.   Skin: Negative for rash.   Allergic/Immunologic: Negative for immunocompromised state.   Neurological: Negative for dizziness.   Psychiatric/Behavioral: Negative for agitation.     Physical Exam:    Blood pressure 130/79, pulse 86, height 5' 11\" (1.803 m), weight 86.2 kg (190 lb).    General/Constitutional: NAD, well developed, well nourished  HENT: Normocephalic, atraumatic  CV: Intact distal pulses, regular rate  Resp: No " respiratory distress or labored breathing  Neuro: Alert and Oriented x 3  Psych: Normal mood, normal affect, normal judgement, normal behavior  Skin: Warm, dry, no rashes, no erythema    right wrist -  Patient presents with no obvious anatomical deformity  Skin is warm and dry to touch with no signs of erythema, ecchymosis, infection  Full range of motion of the wrist and thumb without pain.   No tenderness upon palpation   Strength:  strength slightly weaker on right compared to left   No soft tissue swelling or effusion noted  Full FDS, FDP, extensor mechanisms are intact  No reproducible triggering on exam   Negative Thenar atrophy, Negative intrinsic atrophy  Negative Tinel's at carpal tunnel  Negative Carpal Tunnel Compression  Demonstrates normal wrist, elbow, and shoulder motion  Forearm compartments are soft and supple      UE NV Exam: +2 Radial pulses bilaterally  Sensation intact to light touch C5-T1 bilaterally, Radial/median/ulnar nerve motor intact    Bilateral shoulder, wrist/hand, and forearm ROM full, painless with passive ROM, no ttp or crepitance throughout extremities above wrist joint    Cervical ROM is full without pain, numbness or tingling    Negative spurling maneuver bilaterally       Elbow Imaging:    X-rays of the right wrist were obtained 6/29/2024 and reviewed with the patient.  Based on my independent review, imaging shows No acute osseous abnormality.        Scribe Attestation      I,:  Dany Bedolla am acting as a scribe while in the presence of the attending physician.:       I,:  Darrell Chatman MD personally performed the services described in this documentation    as scribed in my presence.:

## 2024-07-02 ENCOUNTER — TELEPHONE (OUTPATIENT)
Age: 24
End: 2024-07-02

## 2024-07-15 ENCOUNTER — HOSPITAL ENCOUNTER (OUTPATIENT)
Dept: ULTRASOUND IMAGING | Facility: HOSPITAL | Age: 24
Discharge: HOME | End: 2024-07-15
Attending: STUDENT IN AN ORGANIZED HEALTH CARE EDUCATION/TRAINING PROGRAM
Payer: COMMERCIAL

## 2024-07-15 DIAGNOSIS — G56.01 CARPAL TUNNEL SYNDROME OF RIGHT WRIST: ICD-10-CM

## 2024-07-15 PROCEDURE — 76882 US LMTD JT/FCL EVL NVASC XTR: CPT

## 2024-07-23 ENCOUNTER — OFFICE VISIT (OUTPATIENT)
Dept: OBGYN CLINIC | Facility: MEDICAL CENTER | Age: 24
End: 2024-07-23
Payer: COMMERCIAL

## 2024-07-23 VITALS
DIASTOLIC BLOOD PRESSURE: 72 MMHG | BODY MASS INDEX: 28 KG/M2 | WEIGHT: 200 LBS | HEART RATE: 84 BPM | SYSTOLIC BLOOD PRESSURE: 111 MMHG | HEIGHT: 71 IN

## 2024-07-23 DIAGNOSIS — G56.01 CARPAL TUNNEL SYNDROME OF RIGHT WRIST: Primary | ICD-10-CM

## 2024-07-23 DIAGNOSIS — M25.541 PAIN IN THUMB JOINT WITH MOVEMENT OF RIGHT HAND: ICD-10-CM

## 2024-07-23 PROCEDURE — 99213 OFFICE O/P EST LOW 20 MIN: CPT | Performed by: ORTHOPAEDIC SURGERY

## 2024-07-24 NOTE — PROGRESS NOTES
The HAND & UPPER EXTREMITY OFFICE VISIT   Referred By:  Darrell Chatman Md  [address] [place] [address]   [address]  [place] [address]      Chief Complaint:     Intermittent right thumb numbness and weakness    History of Present Illness:   24 y.o., right hand dominant male presents with intermittent right thumb numbness and weakness for the past month.  Denies injury or new activities at work.  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  He has no pain.  He has numbness and weakness in the hand intermittently during certain activities such as swinging a sledgehammer.  No further treatments.  Denies numbness or tingling currently.      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 No pertinent surgical history.  Family History   Problem Relation Age of Onset    No Known Problems Mother     No Known Problems Father      Social History     Socioeconomic History    Marital status: /Civil Union     Spouse name: Not on file    Number of children: Not on file    Years of education: Not on file    Highest education level: Not on file   Occupational History    Not on file   Tobacco Use    Smoking status: Former     Types: E-Cigarettes     Quit date: 4/1/2021     Years since quitting: 3.3     Passive exposure: Past    Smokeless tobacco: Never   Vaping Use    Vaping status: Never Used   Substance and Sexual Activity    Alcohol use: Not Currently     Comment: socially    Drug use: No    Sexual activity: Not Currently   Other Topics Concern    Not on file   Social History Narrative    Has not traveled outside the [place]    No infectious disease exposure    No report of abuse     Social Determinants of Health     Financial Resource Strain: Not on file   Food Insecurity: Not on file   Transportation Needs: Not on file   Physical Activity: Not on file  "  Stress: Not on file   Social Connections: Not on file   Intimate Partner Violence: Not on file   Housing Stability: Not on file     Scheduled Meds:  Continuous Infusions:No current facility-administered medications for this visit.    PRN Meds:.  No Known Allergies        Physical Examination:    /72   Pulse 84   Ht 5' 11\" (1.803 m)   Wt 90.7 kg (200 lb)   BMI 27.89 kg/m²     Gen: A&Ox3, NAD  Cardiac: regular rate  Chest: non labored breathing  Abdomen: Non-distended    Cervcial Spine: Negative Spurling's    Right Upper Extremity:  Skin CDI  No obvious deformity of the shoulder, arm, elbow, forearm, wrist, hand  Nontender  Composite fist  Positive Tinel's at the wrist, positive Durkan's  Negative Tinel's at the elbow or elbow flexion compression test  Sensation intact to light touch in the axillary median, ulnar, and radial nerve distributions  5/5 motor thumb abduction, opposition, interosseous  2+RP      Left Upper Extremity:  Skin CDI  No obvious deformity of the shoulder, arm, elbow, forearm, wrist, hand  Nontender  Composite fist  Negative Tinel's at the wrist, and Durkan's  Negative Tinel's at the elbow or elbow flexion compression test  Sensation intact to light touch in the axillary median, ulnar, and radial nerve distributions  5/5 motor thumb abduction, opposition, interosseous  2+RP      Studies:  Radiographs: I personally reviewed and independently interpreted the available radiographs.  6/29/24: Radiographs of the right wrist, multiple views, demonstrate no fractures or dislocations.  No degenerative changes.  Soft tissues unremarkable.     7/15/2024: Ultrasound of the right carpal tunnel was personally interpreted.  Demonstrates maximal median nerve cross-sectional area 12.8 mm² with a delta CSA of 5.4 mm² risk of formation of 1.7.  Consistent with carpal tunnel syndrome.    Assessment and Plan:  1. Carpal tunnel syndrome of right wrist  Ambulatory Referral to Orthopedic Surgery    Cock Up " Wrist Splint      2. Pain in thumb joint with movement of right hand  Ambulatory Referral to Orthopedic Surgery          24 y.o. male presents with signs and symptoms consistent with the above diagnosis.  We discussed the natural history of this condition and its pathogenesis.  We discussed operative and nonoperative treatment options.    Exam is consistent with mild carpal tunnel syndrome.  His symptoms are acute and intermittent.  He may just be more median neuritis causing a flare in his symptoms.  Discussed treatment options including bracing, corticosteroid injections and surgery.  We also discussed home exercises with nerve glides.  He like to start with bracing and nerve glide exercises.  Wrist brace was provided today.    Follow-up as needed if his symptoms fail to improve.  At that point we can discuss corticosteroid injection versus surgery as appropriate.    he expressed understanding of the plan and agreed. We encouraged them to contact our office with any questions or concerns.         Max Grigsby MD  Hand and Upper Extremity Surgery        *This note was dictated using Dragon voice recognition software. Please excuse any word substitutions or errors.*

## 2025-01-06 ENCOUNTER — TELEPHONE (OUTPATIENT)
Age: 25
End: 2025-01-06

## 2025-01-06 NOTE — TELEPHONE ENCOUNTER
Patient has been added to the Medication Management wait list without a referral.    Insurance: [facility]  Insurance Type:    Commercial [x]   Medicaid []   [facility] (if applicable)   Medicare []  Location Preference: any  Provider Preference: any  Virtual: Yes [x] No []  Were outside resources sent: Yes [] No [x]

## 2025-02-28 ENCOUNTER — TELEPHONE (OUTPATIENT)
Age: 25
End: 2025-02-28

## 2025-02-28 NOTE — TELEPHONE ENCOUNTER
Pt called to schedule an appointment to discuss medication.  Pt previously seen Dr. Patel.    Pt scheduled for a transfer to new provider appointment with Avani on 3/20/25 at 0820 am.

## 2025-04-10 ENCOUNTER — TELEPHONE (OUTPATIENT)
Age: 25
End: 2025-04-10

## 2025-04-10 NOTE — TELEPHONE ENCOUNTER
Reached out to pt in regards to Medication management wait list and to offer a VIRTUAL appt with [facility] JAMESM for pt to contact intake dept.     First attempt to schedule.

## 2025-04-21 ENCOUNTER — OFFICE VISIT (OUTPATIENT)
Dept: URGENT CARE | Facility: CLINIC | Age: 25
End: 2025-04-21
Payer: COMMERCIAL

## 2025-04-21 ENCOUNTER — APPOINTMENT (OUTPATIENT)
Dept: RADIOLOGY | Facility: CLINIC | Age: 25
End: 2025-04-21
Attending: STUDENT IN AN ORGANIZED HEALTH CARE EDUCATION/TRAINING PROGRAM
Payer: COMMERCIAL

## 2025-04-21 VITALS
OXYGEN SATURATION: 100 % | DIASTOLIC BLOOD PRESSURE: 58 MMHG | TEMPERATURE: 98.8 F | SYSTOLIC BLOOD PRESSURE: 128 MMHG | HEART RATE: 74 BPM | HEIGHT: 71 IN | WEIGHT: 204 LBS | BODY MASS INDEX: 28.56 KG/M2 | RESPIRATION RATE: 18 BRPM

## 2025-04-21 DIAGNOSIS — S60.552A FOREIGN BODY IN HAND, LEFT, INITIAL ENCOUNTER: Primary | ICD-10-CM

## 2025-04-21 DIAGNOSIS — M79.5 FOREIGN BODY (FB) IN SOFT TISSUE: ICD-10-CM

## 2025-04-21 PROCEDURE — 99213 OFFICE O/P EST LOW 20 MIN: CPT

## 2025-04-21 PROCEDURE — 73130 X-RAY EXAM OF HAND: CPT

## 2025-04-21 RX ORDER — CEFADROXIL 500 MG/1
500 CAPSULE ORAL EVERY 12 HOURS SCHEDULED
Qty: 10 CAPSULE | Refills: 0 | Status: SHIPPED | OUTPATIENT
Start: 2025-04-21 | End: 2025-04-26

## 2025-04-21 NOTE — PROGRESS NOTES
[facility]        NAME: Darrell Toth is a 25 y.o. male  : 2000    MRN: [id number]  DATE: 2025  TIME: 7:33 PM    Assessment and Plan   Foreign body in hand, left, initial encounter [S60.552A]  1. Foreign body in hand, left, initial encounter  Ambulatory Referral to Orthopedic Surgery    cefadroxil (DURICEF) 500 mg capsule      2. Foreign body (FB) in soft tissue  XR hand 3+ vw left        There is no foreign body visible and unfortunately unable to palpate. Unfortunately there is nor shadowing to make it visible on x-ray. Patient was instructed to continue to keep wound clean and dry and cover. He was prescribed antibiotic and advised to follow up with ortho hand this week for evaluation.    Patient Instructions     Please keep the area clean and dry, wash twice daily with soap and water  Please call tomorrow and make appointment with hand specialist for evaluation  Follow up with PCP in 3-5 days.  Proceed to  ER if symptoms worsen.    If tests have been performed at [facility], our office will contact you with results if changes need to be made to the care plan discussed with you at the visit.  [facility]    Chief Complaint     Chief Complaint   Patient presents with   • Foreign Body in Skin     Left hand has a wooden splinter in it. He was hammering a wood stack when part of it went into his hand.          History of Present Illness       Patient presents for evaluation of splinter that has lodged deep in his left hand. He notes he was hammering in a log at work and a large piece of splinter penetrated his skin. He was able to get most of it out but believes there may be a piece of wood still left in the hand. He notes he is unable to palpated or see if it. He did clean the area thoroughly with soap and water.     Review of Systems   Review of Systems    As stated in HPI      Current Medications       Current Outpatient Medications:   •   cefadroxil (DURICEF) 500 mg capsule, Take 1 capsule (500 mg total) by mouth every 12 (twelve) hours for 5 days, Disp: 10 capsule, Rfl: 0  •  atoMOXetine (STRATTERA) 40 mg capsule, Take 1 capsule (40 mg total) by mouth daily (Patient not taking: Reported on 4/21/2025), Disp: 30 capsule, Rfl: 0  •  hydrocortisone 1 % cream, Apply topically 4 (four) times a day as needed for irritation (Patient not taking: Reported on 4/21/2025), Disp: 30 g, Rfl: 0  •  levocetirizine (XYZAL) 5 MG tablet, Take 1 tablet (5 mg total) by mouth every evening, Disp: 30 tablet, Rfl: 0  •  nystatin (MYCOSTATIN) 500,000 units/5 mL suspension, Apply 5 mL (500,000 Units total) to the mouth or throat 4 (four) times a day (Patient not taking: Reported on 4/21/2025), Disp: 473 mL, Rfl: 0  •  predniSONE 10 mg tablet, Take 6 pills day 1, then 5 pills day 2, 4 pills day 3, 3 pills day 4, 2 pills day 5, 1 pill day 6, (Patient not taking: Reported on 4/21/2025), Disp: 6 tablet, Rfl: 0  •  predniSONE 10 mg tablet, Take 5 tabs po x 2 days; 4 tabs po x 2 days; 3 tabs po x 1 day; 2 tabs po x 1 day. 1 tab po x 1 day. (Patient not taking: Reported on 4/21/2025), Disp: 24 tablet, Rfl: 0  •  Vraylar 1.5 MG capsule, Take 1.5 mg by mouth daily (Patient not taking: Reported on 4/21/2025), Disp: , Rfl:     Current Allergies     Allergies as of 04/21/2025   • (No Known Allergies)            The following portions of the patient's history were reviewed and updated as appropriate: allergies, current medications, past family history, past medical history, past social history, past surgical history and problem list.     Past Medical History:   Diagnosis Date   • ADHD (attention deficit hyperactivity disorder)        No past surgical history on file.    Family History   Problem Relation Age of Onset   • No Known Problems Mother    • No Known Problems Father          Medications have been verified.        Objective   /58   Pulse 74   Temp 98.8 °F (37.1 °C)   Resp  "18   Ht 5' 11\" (1.803 m)   Wt 92.5 kg (204 lb)   SpO2 100%   BMI 28.45 kg/m²   No LMP for male patient.       Physical Exam     Physical Exam  Constitutional:       General: He is not in acute distress.     Appearance: He is well-developed. He is not toxic-appearing.   HENT:      Head: Normocephalic and atraumatic.      Right Ear: External ear normal.      Left Ear: External ear normal.   Eyes:      Extraocular Movements: Extraocular movements intact.   Cardiovascular:      Rate and Rhythm: Normal rate.   Pulmonary:      Effort: Pulmonary effort is normal.   Musculoskeletal:      Comments: Focused exam of left hand reveals a small 0.1 cm puncture wound volar aspect of 1st webspace. There is no palpable foreign body or visible superficial foreign body.    Neurological:      General: No focal deficit present.      Mental Status: He is alert and oriented to person, place, and time.   Psychiatric:         Mood and Affect: Mood normal.                 "

## 2025-04-29 NOTE — TELEPHONE ENCOUNTER
Reached out to pt in regards to Medication management wait list and to offer a VIRTUAL appt with [facility] ALEJANDRO for pt to contact intake dept.     Second attempt. Pt removed from wait list.

## 2025-05-02 ENCOUNTER — OFFICE VISIT (OUTPATIENT)
Dept: OBGYN CLINIC | Facility: CLINIC | Age: 25
End: 2025-05-02
Payer: COMMERCIAL

## 2025-05-02 VITALS — WEIGHT: 204 LBS | BODY MASS INDEX: 28.56 KG/M2 | HEIGHT: 71 IN

## 2025-05-02 DIAGNOSIS — S60.552A FOREIGN BODY OF LEFT HAND, INITIAL ENCOUNTER: Primary | ICD-10-CM

## 2025-05-02 PROCEDURE — 99213 OFFICE O/P EST LOW 20 MIN: CPT | Performed by: ORTHOPAEDIC SURGERY

## 2025-05-02 NOTE — PROGRESS NOTES
The HAND & UPPER EXTREMITY OFFICE VISIT         Assessment and Plan:  Assessment & Plan  Foreign body of left hand, initial encounter  No signs of infection, no palpable FB,  Weightbearing as tolerated left upper extremity  Symptomatic conservative treatment with icing, resting, over-the-counter anti-inflammatories.  If he has persistent pain and symptoms we can consider getting an ultrasound to visualize any radiolucent foreign bodies  Follow-up as needed             It is recommended he return to the office as needed if the problem fails to improve, worsens, or recurs.    he expressed understanding of the plan and agreed. We encouraged them to contact our office with any questions or concerns.     Referred By:  Keyur Queen Iii, [address] [address]    Chief Complaint:     Left hand pain      History of Present Illness:   25 y.o., Right hand dominant male presents with with concerns about his left hand after an injury he sustained at work approximately 2 weeks ago where he was holding a wooden stake and a piece of it broke off into his first dorsal webspace.  He was able to pull out part of the stake and a second piece worked its way out last week.  He went to the emergency department and x-rays were taken but did not find any foreign body.  He states that this time he has minimal pain and full strength with no numbness or tingling but feels some pressure over his dorsal webspace.  He is mainly here for reassurance that he did not damage any of the structures in his hand.      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 No pertinent surgical history.  Family History   Problem Relation Age of Onset    No Known Problems Mother     No Known Problems Father      Social History     Socioeconomic History  "   Marital status: /Civil Union     Spouse name: Not on file    Number of children: Not on file    Years of education: Not on file    Highest education level: Not on file   Occupational History    Not on file   Tobacco Use    Smoking status: Every Day     Types: E-Cigarettes     Last attempt to quit: 2021     Years since quittin.0     Passive exposure: Past    Smokeless tobacco: Never   Vaping Use    Vaping status: Never Used   Substance and Sexual Activity    Alcohol use: Not Currently     Comment: socially    Drug use: No    Sexual activity: Not Currently   Other Topics Concern    Not on file   Social History Narrative    Has not traveled outside the [place]    No infectious disease exposure    No report of abuse     Social [employer]     Financial Resource Strain: Not on file   Food Insecurity: Not on file   Transportation Needs: Not on file   Physical Activity: Not on file   Stress: Not on file   Social Connections: Not on file   Intimate Partner Violence: Not on file   Housing Stability: Not on file     Scheduled Meds:  Continuous Infusions:No current facility-administered medications for this visit.    PRN Meds:.  No Known Allergies        Physical Examination:    Ht 5' 11\" (1.803 m)   Wt 92.5 kg (204 lb)   BMI 28.45 kg/m²     Gen: A&Ox3, NAD  Cardiac: regular rate  Chest: non labored breathing  Abdomen: Non-distended      Left Upper Extremity:  Skin CDI, small closed puncture wound healing over 1st dorsal webspace  No obvious deformity of the shoulder, arm, elbow, forearm, wrist, hand  Mild TTP over 1st dorsal webspace  Full ROM to thumb adduction, abduction, flexion,  Sensation intact to light touch in the axillary median, ulnar, and radial nerve distributions  5/5 motor AIN, PIN, Ulnar   Warm, well-perfused digits  Cap refill <2s              Studies:  Radiographs: I personally reviewed and independently interpreted the available radiographs: Radiographs of the left hand, multiple " views, demonstrate no acute fracture or dislocation, no visible foreign body.     Labs:  I personally reviewed the following labs,   Lab Results   Component Value Date    HGBA1C 5.0 07/20/2022                 Max Grigsby MD  Hand and Upper Extremity Surgery        *This note was dictated using Dragon voice recognition software. Please excuse any word substitutions or errors.*      Scribe Attestation      I,:  Brennen Edmonds MD am acting as a scribe while in the presence of the attending physician.:       I,:  Max Grigsby MD personally performed the services described in this documentation    as scribed in my presence.:

## 2025-05-02 NOTE — LETTER
May 2, 2025     Geeta Patel DO  [address]  [address]  [facility] [address]    Patient: Darrell Toth   YOB: 2000   Date of Visit: 5/2/2025       Dear Dr. Geeta Patel, DO Keyur Queen III, DO:    Thank you for referring Darrell Toth to me for evaluation. Below are my notes for this consultation.    If you have questions, please do not hesitate to call me. I look forward to following your patient along with you.         Sincerely,        Max Grigsby MD        CC: DO Brennen Wilkins III, MD  5/2/2025  2:40 PM  Sign when Signing Visit  The HAND & UPPER EXTREMITY OFFICE VISIT         Assessment and Plan:  Assessment & Plan  Foreign body of left hand, initial encounter  Weightbearing as tolerated left upper extremity  Symptomatic conservative treatment with icing, resting, over-the-counter anti-inflammatories.  If he has persistent pain and symptoms we can consider getting an ultrasound to visualize any radiolucent foreign bodies  Follow-up as needed           25 y.o. male presents with signs and symptoms consistent with the above diagnosis.  We discussed the natural history of this condition and its pathogenesis.  We discussed operative and nonoperative treatment options.      It is recommended he return to the office as needed if the problem fails to improve, worsens, or recurs.    he expressed understanding of the plan and agreed. We encouraged them to contact our office with any questions or concerns.     Referred By:  Keyur Queen Iii, Do  [address] [place] [id number]    Chief Complaint:     Left hand pain      History of Present Illness:   25 y.o., Right hand dominant male presents with with concerns about his left hand after an injury he sustained at work approximately 2 weeks ago where he was holding a wooden stake and a piece of it broke off into his first dorsal webspace.  He was able to pull out part of the  stake and a second piece worked its way out last week.  He went to the emergency department and x-rays were taken but did not find any foreign body.  He states that this time he has minimal pain and full strength with no numbness or tingling but feels some pressure over his dorsal webspace.  He is mainly here for reassurance that he did not damage any of the structures in his hand.      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 No pertinent surgical history.  Family History   Problem Relation Age of Onset    No Known Problems Mother     No Known Problems Father      Social History     Socioeconomic History    Marital status: /Civil Union     Spouse name: Not on file    Number of children: Not on file    Years of education: Not on file    Highest education level: Not on file   Occupational History    Not on file   Tobacco Use    Smoking status: Every Day     Types: E-Cigarettes     Last attempt to quit: 2021     Years since quittin.0     Passive exposure: Past    Smokeless tobacco: Never   Vaping Use    Vaping status: Never Used   Substance and Sexual Activity    Alcohol use: Not Currently     Comment: socially    Drug use: No    Sexual activity: Not Currently   Other Topics Concern    Not on file   Social History Narrative    Has not traveled outside the [place]    No infectious disease exposure    No report of abuse     Social [employer]     Financial Resource Strain: Not on file   Food Insecurity: Not on file   Transportation Needs: Not on file   Physical Activity: Not on file   Stress: Not on file   Social Connections: Not on file   Intimate Partner Violence: Not on file   Housing Stability: Not on file     Scheduled Meds:  Continuous Infusions:No current facility-administered medications for this  "visit.    PRN Meds:.  No Known Allergies        Physical Examination:    Ht 5' 11\" (1.803 m)   Wt 92.5 kg (204 lb)   BMI 28.45 kg/m²     Gen: A&Ox3, NAD  Cardiac: regular rate  Chest: non labored breathing  Abdomen: Non-distended      Left Upper Extremity:  Skin CDI, small closed puncture wound healing over 1st dorsal webspace  No obvious deformity of the shoulder, arm, elbow, forearm, wrist, hand  TTP over 1st dorsal webspace  Full ROM to thumb adduction, abduction, flexion,  Sensation intact to light touch in the axillary median, ulnar, and radial nerve distributions  5/5 motor AIN, PIN, Ulnar   Warm, well-perfused digits  Cap refill <2s              Studies:  Radiographs: I personally reviewed and independently interpreted the available radiographs: Radiographs of the left hand, multiple views, demonstrate no acute fracture or dislocation, no visible foreign body.     Labs:  I personally reviewed the following labs,   Lab Results   Component Value Date    HGBA1C 5.0 07/20/2022                 Max Grigsby MD  Hand and Upper Extremity Surgery        *This note was dictated using Dragon voice recognition software. Please excuse any word substitutions or errors.*      Scribe Attestation      I,:   am acting as a scribe while in the presence of the attending physician.:       I,:   personally performed the services described in this documentation    as scribed in my presence.:               "

## 2025-05-28 ENCOUNTER — OFFICE VISIT (OUTPATIENT)
Dept: FAMILY MEDICINE CLINIC | Facility: CLINIC | Age: 25
End: 2025-05-28
Payer: COMMERCIAL

## 2025-05-28 VITALS
WEIGHT: 204 LBS | SYSTOLIC BLOOD PRESSURE: 120 MMHG | DIASTOLIC BLOOD PRESSURE: 76 MMHG | TEMPERATURE: 98.8 F | HEART RATE: 91 BPM | BODY MASS INDEX: 28.56 KG/M2 | HEIGHT: 71 IN | RESPIRATION RATE: 18 BRPM | OXYGEN SATURATION: 99 %

## 2025-05-28 DIAGNOSIS — F90.9 ATTENTION DEFICIT HYPERACTIVITY DISORDER (ADHD), UNSPECIFIED ADHD TYPE: ICD-10-CM

## 2025-05-28 DIAGNOSIS — F33.1 MODERATE EPISODE OF RECURRENT MAJOR DEPRESSIVE DISORDER (HCC): Primary | ICD-10-CM

## 2025-05-28 DIAGNOSIS — Z86.59 HISTORY OF ADHD: ICD-10-CM

## 2025-05-28 PROCEDURE — 99204 OFFICE O/P NEW MOD 45 MIN: CPT | Performed by: FAMILY MEDICINE

## 2025-05-28 RX ORDER — CARIPRAZINE 1.5 MG/1
1.5 CAPSULE, GELATIN COATED ORAL DAILY
Qty: 90 CAPSULE | Refills: 0 | Status: SHIPPED | OUTPATIENT
Start: 2025-05-28

## 2025-05-28 NOTE — PROGRESS NOTES
Name: Darrell Toth      : 2000      MRN: [id number]  Encounter Provider: Jc Pina MD  Encounter Date: 2025   Encounter department: [facility]    :  Assessment & Plan  Moderate episode of recurrent major depressive disorder (HCC)      Orders:    Vraylar 1.5 MG capsule; Take 1 capsule (1.5 mg total) by mouth daily    Lipid panel; Future    Comprehensive metabolic panel; Future    TSH, 3rd generation with Free T4 reflex; Future    CBC and differential; Future    History of ADHD         Attention deficit hyperactivity disorder (ADHD), unspecified ADHD type  Off meds. Continue to monitor          Assessment & Plan  1. Mood disorder.  - Reports a history of using Vraylar for mood stabilization and wishes to resume it due to worsening mood and behavior.  - No side effects reported from previous use of Vraylar.  - Prescription for Vraylar 1.5 mg issued, with a 90-day supply sent to pharmacy.  - Comprehensive panel, including lipid panel, thyroid panel, and CBC, ordered to establish baseline levels and rule out other causes of symptoms.    Follow-up   Get labs.   The patient will follow up in 6 to 8 weeks.        Depression Screening and Follow-up Plan: Patient was screened for depression during today's encounter. They screened negative with a PHQ-2 score of 0.          History of Present Illness     History of Present Illness  The patient presents to establish care.    He reports no current health concerns but expresses a desire to resume his Vraylar regimen, which he discontinued approximately a year ago. He was previously on Vraylar for several months, primarily for mood stabilization, and experienced an improvement in his symptoms during that period. However, he has since noticed a resurgence of mood-related issues, including sudden outbursts of anger. He also reports difficulty concentrating and restlessness, symptoms suggestive of ADHD. He has not tried any other  "medications apart from Vraylar. He did not experience any adverse effects from the medication.     Review of Systems   All other systems reviewed and are negative.    Objective   /76   Pulse 91   Temp 98.8 °F (37.1 °C)   Resp 18   Ht 5' 11\" (1.803 m)   Wt 92.5 kg (204 lb)   SpO2 99%   BMI 28.45 kg/m²     Physical Exam    Physical Exam  Vitals and nursing note reviewed.   Constitutional:       General: He is not in acute distress.     Appearance: Normal appearance. He is well-developed. He is not ill-appearing, toxic-appearing or diaphoretic.   HENT:      Head: Normocephalic and atraumatic.     Eyes:      General:         Right eye: No discharge.         Left eye: No discharge.      Extraocular Movements: Extraocular movements intact.      Conjunctiva/sclera: Conjunctivae normal.       Cardiovascular:      Rate and Rhythm: Normal rate.      Pulses:           Dorsalis pedis pulses are 2+ on the right side and 2+ on the left side.        Posterior tibial pulses are 2+ on the right side and 2+ on the left side.   Pulmonary:      Effort: Pulmonary effort is normal.     Musculoskeletal:      Cervical back: Normal range of motion and neck supple.   Feet:      Right foot:      Skin integrity: No ulcer, skin breakdown, erythema, warmth, callus or dry skin.      Left foot:      Skin integrity: No ulcer, skin breakdown, erythema, warmth, callus or dry skin.     Skin:     General: Skin is dry.      Capillary Refill: Capillary refill takes less than 2 seconds.     Neurological:      Mental Status: He is alert and oriented to person, place, and time.     Psychiatric:         Mood and Affect: Mood normal.         Behavior: Behavior normal.         Thought Content: Thought content normal.         Judgment: Judgment normal.         "

## 2025-07-02 DIAGNOSIS — F33.1 MODERATE EPISODE OF RECURRENT MAJOR DEPRESSIVE DISORDER (HCC): ICD-10-CM

## 2025-07-03 RX ORDER — CARIPRAZINE 1.5 MG/1
1.5 CAPSULE, GELATIN COATED ORAL DAILY
Qty: 90 CAPSULE | Refills: 0 | Status: SHIPPED | OUTPATIENT
Start: 2025-07-03

## 2025-07-03 NOTE — TELEPHONE ENCOUNTER
Patient requesting refill(s) of: vraylar 1.5 mg daily     Last filled: 5/28/25 #90 x 0  Last appt: 5/28/25  Next appt: 7/15/25  Pharmacy: [facility]

## 2025-07-15 ENCOUNTER — OFFICE VISIT (OUTPATIENT)
Dept: FAMILY MEDICINE CLINIC | Facility: CLINIC | Age: 25
End: 2025-07-15
Payer: COMMERCIAL

## 2025-07-15 VITALS
TEMPERATURE: 98 F | SYSTOLIC BLOOD PRESSURE: 126 MMHG | DIASTOLIC BLOOD PRESSURE: 82 MMHG | BODY MASS INDEX: 30.66 KG/M2 | HEIGHT: 71 IN | RESPIRATION RATE: 18 BRPM | OXYGEN SATURATION: 97 % | WEIGHT: 219 LBS | HEART RATE: 82 BPM

## 2025-07-15 DIAGNOSIS — Z00.00 ANNUAL PHYSICAL EXAM: Primary | ICD-10-CM

## 2025-07-15 DIAGNOSIS — Z86.59 HISTORY OF ADHD: ICD-10-CM

## 2025-07-15 DIAGNOSIS — F33.1 MODERATE EPISODE OF RECURRENT MAJOR DEPRESSIVE DISORDER (HCC): ICD-10-CM

## 2025-07-15 PROCEDURE — 99395 PREV VISIT EST AGE 18-39: CPT | Performed by: FAMILY MEDICINE

## 2025-07-15 PROCEDURE — 99214 OFFICE O/P EST MOD 30 MIN: CPT | Performed by: FAMILY MEDICINE

## 2025-07-15 RX ORDER — BUPROPION HYDROCHLORIDE 150 MG/1
150 TABLET ORAL EVERY MORNING
Qty: 30 TABLET | Refills: 5 | Status: SHIPPED | OUTPATIENT
Start: 2025-07-15 | End: 2026-01-11

## 2025-07-16 ENCOUNTER — TELEPHONE (OUTPATIENT)
Age: 25
End: 2025-07-16

## 2025-07-16 NOTE — TELEPHONE ENCOUNTER
Patient has been added to the Medication Management wait list with a referral.    Insurance: [facility]   Insurance Type:    Commercial [x]   Medicaid []   [place] (if applicable[place]   Medicare []  Location Preference: [place]   Provider Preference: any  Virtual: Yes [x] No []  Were outside resources sent: Yes [] No [x]    Patient declined outside resources.     Patients mother is a [employer] [facility] employee.

## 2025-07-18 NOTE — PROGRESS NOTES
"Name: Darrell Toth      : 2000      MRN: [id number]  Encounter Provider: Jc Pina MD  Encounter Date: 7/15/2025   Encounter department: [facility] PR[facility]    :  Assessment & Plan  History of ADHD    Orders:    buPROPion (WELLBUTRIN XL) 150 mg 24 hr tablet; Take 1 tablet (150 mg total) by mouth every morning    Ambulatory referral to Psych Services; Future    Moderate episode of recurrent major depressive disorder (HCC)  Stable on vraylar          Annual physical exam           Assessment & Plan        Follow-up  - Follow-up appointment scheduled for 3 months from now.           History of Present Illness     History of Present Illness  The patient presents for a follow-up and physical examination.    He is seeking a change in his medication regimen to improve his focus and concentration. He has been diagnosed with ADHD and has previously consulted with a psychiatrist. . He experiences difficulty maintaining attention and focus, often forgetting tasks, which is affecting his work performance. He was on medications during his younger years including Concerta, Ritalin, and Adderall, but he believes he has developed a tolerance to these drugs. He is currently taking Vraylar and has not yet completed his blood work.     Review of Systems   All other systems reviewed and are negative.    Objective   /82   Pulse 82   Temp 98 °F (36.7 °C) (Temporal)   Resp 18   Ht 5' 11\" (1.803 m)   Wt 99.3 kg (219 lb)   SpO2 97%   BMI 30.54 kg/m²     Physical Exam  Respiratory: Clear to auscultation, no wheezing, rales or rhonchi  Cardiovascular: Regular rate and rhythm, no murmurs, rubs, or gallops  Physical Exam  Vitals and nursing note reviewed.   Constitutional:       General: He is not in acute distress.     Appearance: Normal appearance. He is well-developed. He is not ill-appearing, toxic-appearing or diaphoretic.   HENT:      Head: Normocephalic and atraumatic.      Nose: Nose " normal.      Mouth/Throat:      Mouth: Mucous membranes are moist.      Pharynx: Oropharynx is clear. No oropharyngeal exudate or posterior oropharyngeal erythema.     Eyes:      General: No scleral icterus.        Right eye: No discharge.         Left eye: No discharge.      Extraocular Movements: Extraocular movements intact.      Conjunctiva/sclera: Conjunctivae normal.      Pupils: Pupils are equal, round, and reactive to light.       Cardiovascular:      Rate and Rhythm: Normal rate and regular rhythm.      Pulses: Normal pulses.      Heart sounds: Normal heart sounds. No murmur heard.  Pulmonary:      Effort: Pulmonary effort is normal. No respiratory distress.      Breath sounds: Normal breath sounds.   Abdominal:      General: There is no distension.      Palpations: Abdomen is soft. There is no mass.      Tenderness: There is no abdominal tenderness. There is no guarding or rebound.      Hernia: No hernia is present.     Musculoskeletal:         General: Normal range of motion.      Cervical back: Normal range of motion and neck supple. No rigidity or tenderness.      Right lower leg: No edema.      Left lower leg: No edema.   Lymphadenopathy:      Cervical: No cervical adenopathy.     Skin:     General: Skin is warm and dry.     Neurological:      General: No focal deficit present.      Mental Status: He is alert and oriented to person, place, and time.      Cranial Nerves: No cranial nerve deficit.      Motor: No weakness.      Gait: Gait normal.     Psychiatric:         Mood and Affect: Mood normal.         Behavior: Behavior normal.         Thought Content: Thought content normal.         Judgment: Judgment normal.

## 2025-07-18 NOTE — ASSESSMENT & PLAN NOTE
Orders:    buPROPion (WELLBUTRIN XL) 150 mg 24 hr tablet; Take 1 tablet (150 mg total) by mouth every morning    Ambulatory referral to Psych Services; Future

## 2025-08-16 DIAGNOSIS — F33.1 MODERATE EPISODE OF RECURRENT MAJOR DEPRESSIVE DISORDER (HCC): ICD-10-CM

## 2025-08-18 ENCOUNTER — TELEPHONE (OUTPATIENT)
Age: 25
End: 2025-08-18

## 2025-08-18 RX ORDER — CARIPRAZINE 1.5 MG/1
1.5 CAPSULE, GELATIN COATED ORAL DAILY
Qty: 90 CAPSULE | Refills: 0 | Status: SHIPPED | OUTPATIENT
Start: 2025-08-18